# Patient Record
Sex: FEMALE | Race: WHITE | Employment: OTHER | URBAN - METROPOLITAN AREA
[De-identification: names, ages, dates, MRNs, and addresses within clinical notes are randomized per-mention and may not be internally consistent; named-entity substitution may affect disease eponyms.]

---

## 2018-11-15 ENCOUNTER — OFFICE VISIT (OUTPATIENT)
Dept: SURGERY | Age: 83
End: 2018-11-15

## 2018-11-15 VITALS
BODY MASS INDEX: 32.27 KG/M2 | OXYGEN SATURATION: 97 % | TEMPERATURE: 98.2 F | DIASTOLIC BLOOD PRESSURE: 84 MMHG | HEIGHT: 64 IN | WEIGHT: 189 LBS | RESPIRATION RATE: 18 BRPM | SYSTOLIC BLOOD PRESSURE: 130 MMHG | HEART RATE: 110 BPM

## 2018-11-15 DIAGNOSIS — L72.3 SEBACEOUS CYST: Primary | ICD-10-CM

## 2018-11-15 RX ORDER — FAMOTIDINE 40 MG/1
40 TABLET, FILM COATED ORAL DAILY
COMMUNITY

## 2018-11-15 RX ORDER — WARFARIN SODIUM 5 MG/1
5 TABLET ORAL DAILY
COMMUNITY

## 2018-11-15 RX ORDER — METOPROLOL TARTRATE 25 MG/1
TABLET, FILM COATED ORAL 2 TIMES DAILY
COMMUNITY

## 2018-11-15 RX ORDER — DICLOFENAC SODIUM 75 MG/1
TABLET, DELAYED RELEASE ORAL
COMMUNITY

## 2018-11-15 NOTE — PROGRESS NOTES
1. Have you been to the ER, urgent care clinic since your last visit? Hospitalized since your last visit? No    2. Have you seen or consulted any other health care providers outside of the [address] [place] [address] since your last visit? Include any pap smears or colon screening.  No

## 2018-11-15 NOTE — PROGRESS NOTES
HISTORY OF PRESENT ILLNESS  Brii Liu is a 82 y.o. female who presents for evaluation of an infected sebaceous cyst on her left shoulder. Ms. Jessie Tinoco tells me that she has had a subcutaneous mass on her left shoulder for several years now. The mass recently became larger and more bothersome to her. Associated purulent drainage. No abx therapy. She has otherwise been in her usual state of health. Past Medical History:  No date:  Adverse effect of anesthesia      Comment:  \"lost\" 4 days on Morphine  12/28/11: Arrhythmia      Comment:  DR Angeles Martinez DCd DILTIAZEM DT NO ISSUES 2014  No date: Arthritis  No date: GERD (gastroesophageal reflux disease)      Comment:  CONTROLLED WITH OMEPRAZOLE  No date: Ill-defined condition      Comment:  constipation  4/14/2014: Sebaceous cyst  No date: Snores    Past Surgical History:  1953: HX APPENDECTOMY  2008: HX BREAST BIOPSY  2006: HX BREAST REDUCTION  1981: HX CERVICAL FUSION  1973: HX DILATION AND CURETTAGE  No date: HX GI      Comment:  appendectomy  1975: HX GI      Comment:  hemorrhoidectomy  No date: HX HEENT      Comment:  neck surgery cervical discectomy  1974: HX HERNIA REPAIR      Comment:  UMBILICAL  2504: HX HYSTERECTOMY      Comment:  VAGINAL  2005: HX MOHS PROCEDURES      Comment:  right shoulder  1991: HX ORTHOPAEDIC      Comment:  left leg/ankle fx repair  2004: HX ORTHOPAEDIC      Comment:  right BUNIONECTOMY  2009: HX ORTHOPAEDIC      Comment:  RIGHT HAMMERTOE  2015: HX ORTHOPAEDIC      Comment:  R TOTAL KNEE  2012: HX ORTHOPAEDIC      Comment:  back fusion  5/15/13: HX OTHER SURGICAL      Comment:  surgery on neck glands/Parathyroid  4/14/14: HX OTHER SURGICAL      Comment:  cyst removed from back  2013: HX PARATHYROIDECTOMY  2012: HX UROLOGICAL      Comment:  lithotrypsy , 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     Review of patient's family history indicates:  Problem: Emphysema      Relation: Father          Age of Onset: (Not Specified)  Problem: Diabetes Relation: Sister          Age of Onset: (Not Specified)  Problem: Arthritis-osteo      Relation: Sister          Age of Onset: (Not Specified)  Problem: Arthritis-osteo      Relation: Sister          Age of Onset: (Not Specified)  Problem: Cancer      Relation: Daughter          Age of Onset: (Not Specified)          Comment: LUNG CANCER WITH METS TO SPINE  Problem: No Known Problems      Relation: Son          Age of Onset: (Not Specified)  Problem: Anesth Problems      Relation: Neg Hx          Age of Onset: (Not Specified)    Social History: Employment - Retired. Tobacco - Denies. EtOH - Denies. Review of systems negative except as noted. Review of Systems   Constitutional: Negative for chills and fever. Gastrointestinal: Negative for nausea and vomiting. Musculoskeletal:        Pain at site of infected sebaceous cyst.       Physical Exam   Constitutional: She appears well-developed and well-nourished. No distress. HENT:   Head: Normocephalic and atraumatic. Eyes: No scleral icterus. Neck: Neck supple. Cardiovascular: Normal rate and regular rhythm. Pulmonary/Chest: Effort normal and breath sounds normal.   Abdominal: Soft. She exhibits no distension. There is no tenderness. Musculoskeletal: Normal range of motion. Skin:        Approx. 2.5cm x 2.5cm tender, fluctuant subcutaneous mass. Overlying skin is erythematous and indurated. Purulent drainage is noted. Clinically, this is c/w an infected sebaceous cyst.   Vitals reviewed. ASSESSMENT and PLAN  Timothy Pompa is a 82 y.o. female with an infected sebaceous cyst on her left shoulder. In view of the findings on history and physical exam she should benefit from excision. Discussed procedure with her including risks of bleeding, infection, need for further surgery. She understands and wishes to proceed. Consent on chart.      [facility] GENERAL SURGERY [id number]  OFFICE PROCEDURE PROGRESS NOTE        Chart reviewed for the following:   Yobany Vázquez MD, have reviewed the History, Physical and updated the Allergic reactions for [address] performed immediately prior to start of procedure:   Yobany Vázquez MD, have performed the following reviews on Nasra Hews prior to the start of the procedure:            * Patient was identified by name and date of birth   * Agreement on procedure being performed was verified  * Risks and Benefits explained to the patient  * Procedure site verified and marked as necessary  * Patient was positioned for comfort  * Consent was signed and verified     Time: 3:10 PM      Date of procedure: 11/15/2018    Procedure performed by:  Maegan Fulton MD    Provider assisted by: Ирина Saha LPN    How tolerated by patient: tolerated the procedure well with no complications    Post Procedural Pain Scale: 0 - No Hurt    Comments: None. Procedure: Following betadine prep and drape, local anesthetic was infiltrated and an incision over the infected sebaceous cyst was opened sharply. Purulent material and sebaceous cyst contents were evacuated. Remaining sebaceous cyst was dissected free circumferentially and excised. Skin edges sharply debrided back to healthy appearing skin. Hemostasis with pressure. Wound packed with iodoform gauze. Dry dressing applied. Told Ms. Nakia Serna that she can remove packing tomorrow and get open wound wet. Asked her to start local wound care. She tells me that her  can do wound care. Tylenol or Motrin for pain. Do not believe that there is a need for abx at this time. Will see in two more weeks or earlier if need be. She is agreeable to this plan of action and is most certainly free to contact the office should any questions or concerns arise.       CC: Kathleen Levy MD

## 2018-11-15 NOTE — PATIENT INSTRUCTIONS
Daily wound care:  Remove packing daily  Repack with 1/2 inch iodoform packing strips  Cover with 4X4 and tape  You may bathe and or shower and get wound wet               Epidermoid Cyst: Care Instructions  Your Care Instructions  An epidermoid (say \"Radha\") cyst is a lump just under the skin. These cysts can form when a hair follicle becomes blocked. They are common in acne and may occur on the face, neck, back, and genitals. However, they can form anywhere on the body. These cysts are not cancer and do not lead to cancer. They tend not to hurt, but they can sometimes become swollen and painful. They also may break open (rupture) and cause scarring. These cysts sometimes do not cause problems and may not need treatment. If you have a cyst that is swollen and hurts, your doctor may inject it with a medicine to help it heal. But it is more likely that a painful cyst will need to be removed. Your doctor will give you a shot of numbing medicine and cut into the cyst to drain it or remove it. This makes the symptoms go away. Follow-up care is a key part of your treatment and safety. Be sure to make and go to all appointments, and call your doctor if you are having problems. It's also a good idea to know your test results and keep a list of the medicines you take. How can you care for yourself at home? · Do not squeeze the cyst or poke it with a needle to open it. This can cause swelling, redness, and infection. · Always have a doctor look at any new lumps you get to make sure that they are not serious. When should you call for help? Watch closely for changes in your health, and be sure to contact your doctor if:    · You have a fever, redness, or swelling after you get a shot of medicine in the cyst.     · You see or feel a new lump on your skin. Where can you learn more? Go to http://vita-angella.info/.   Enter A737 in the search box to learn more about \"Epidermoid Cyst: Care Instructions. \"  Current as of: April 18, 2018  Content Version: 11.8  © 0671-7086 Healthwise, [facility]. Care instructions adapted under license by [employer] (which disclaims liability or warranty for this information). If you have questions about a medical condition or this instruction, always ask your healthcare professional. [place] [address] any warranty or liability for your use of this information.

## 2018-11-27 ENCOUNTER — OFFICE VISIT (OUTPATIENT)
Dept: SURGERY | Age: 83
End: 2018-11-27

## 2018-11-27 VITALS
WEIGHT: 189 LBS | DIASTOLIC BLOOD PRESSURE: 86 MMHG | TEMPERATURE: 97.8 F | HEIGHT: 64 IN | HEART RATE: 76 BPM | OXYGEN SATURATION: 98 % | RESPIRATION RATE: 16 BRPM | BODY MASS INDEX: 32.27 KG/M2 | SYSTOLIC BLOOD PRESSURE: 132 MMHG

## 2018-11-27 DIAGNOSIS — L72.3 SEBACEOUS CYST: Primary | ICD-10-CM

## 2018-11-27 NOTE — PROGRESS NOTES
HISTORY OF PRESENT ILLNESS  Annabel Bryan is a 82 y.o. female who returns for a wound check. Ms. Jenelle Ricci is s/p excision of an infected sebaceous cyst from her left shoulder on 11/15/2018. Doing well since then. She has no complaints today. No problems with local wound care. Review of systems negative except as noted. Review of Systems   Constitutional: Negative for chills and fever. Musculoskeletal:        Denies pain at surgical site. Physical Exam   Constitutional: She appears well-developed and well-nourished. No distress. Musculoskeletal: Normal range of motion. Neurological: She is alert. Skin:   Left shoulder wound is clean and granulating. Vitals reviewed. ASSESSMENT and PLAN  Wound repacked with iodoform gauze. Reassured Ms. Jenelle Ricci that she is doing well and that the wound is healing nicely. Told her that she can remove packing tomorrow and to just keep a dry dressing over the wound. Also told her that she can get the open wound wet. Will see in two more weeks or earlier if need be.      CC: Seferino Whitaker MD

## 2018-11-27 NOTE — PROGRESS NOTES
1. Have you been to the ER, urgent care clinic since your last visit? Hospitalized since your last visit? No    2. Have you seen or consulted any other health care providers outside of the [address] [place] [address] since your last visit? Include any pap smears or colon screening.  No

## 2019-02-08 ENCOUNTER — TELEPHONE (OUTPATIENT)
Dept: SURGERY | Age: 84
End: 2019-02-08

## 2019-04-16 ENCOUNTER — OFFICE VISIT (OUTPATIENT)
Dept: SURGERY | Age: 84
End: 2019-04-16

## 2019-04-16 VITALS
HEIGHT: 64 IN | BODY MASS INDEX: 33.29 KG/M2 | WEIGHT: 195 LBS | TEMPERATURE: 97.7 F | SYSTOLIC BLOOD PRESSURE: 145 MMHG | DIASTOLIC BLOOD PRESSURE: 82 MMHG | HEART RATE: 76 BPM | OXYGEN SATURATION: 96 % | RESPIRATION RATE: 18 BRPM

## 2019-04-16 DIAGNOSIS — L72.3 SEBACEOUS CYST: Primary | ICD-10-CM

## 2019-04-16 NOTE — PROGRESS NOTES
HISTORY OF PRESENT ILLNESS Theresa Gonzalez is a 82 y.o. female who returns for a wound check. Ms. Aleksandr Martin is s/p excision of an infected sebaceous cyst from her left shoulder on 11/15/2018. Doing well since her last visit. She reports occasional discomfort at the surgical site. No purulent or bloody drainage. No other complaints today. She has otherwise been in her usual state of health. Past Medical History: 
No date: Adverse effect of anesthesia Comment:  \"lost\" 4 days on Morphine 12/28/11: Arrhythmia Comment:  DR Stephen Guzmán DCd DILTIAZEM DT NO ISSUES 2014 No date: Arthritis No date: GERD (gastroesophageal reflux disease) Comment:  CONTROLLED WITH OMEPRAZOLE No date: Ill-defined condition Comment:  constipation 4/14/2014: Sebaceous cyst 
No date: Snores Past Surgical History: 
1953: HX APPENDECTOMY 
2008: HX BREAST BIOPSY 
2006: HX BREAST REDUCTION 
1981: HX CERVICAL FUSION 
11/15/2018: HX CYST INCISION AND DRAINAGE; Left Comment:  excision of sebaceous cyst left shoulder 1973: HX DILATION AND CURETTAGE No date: HX GI Comment:  appendectomy 1975: HX GI Comment:  hemorrhoidectomy No date: HX HEENT Comment:  neck surgery cervical discectomy 1974: HX HERNIA REPAIR Comment:  UMBILICAL 
6601: HX HYSTERECTOMY Comment:  VAGINAL 
2005: HX MOHS PROCEDURES Comment:  right shoulder 
1991: HX ORTHOPAEDIC Comment:  left leg/ankle fx repair 2004: HX ORTHOPAEDIC Comment:  right BUNIONECTOMY 
2009: HX ORTHOPAEDIC Comment:  RIGHT HAMMERTOE 2015: HX ORTHOPAEDIC Comment:  R TOTAL KNEE 
2012: HX ORTHOPAEDIC Comment:  back fusion 5/15/13: HX OTHER SURGICAL Comment:  surgery on neck glands/Parathyroid 4/14/14: HX OTHER SURGICAL Comment:  cyst removed from back 2013: HX PARATHYROIDECTOMY 
2012: HX UROLOGICAL Comment:  lithotrypsy , 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 Review of patient's family history indicates: Problem: Emphysema Relation: Father Age of Onset: (Not Specified) Problem: Diabetes Relation: Sister Age of Onset: (Not Specified) Problem: Arthritis-osteo Relation: Sister Age of Onset: (Not Specified) Problem: Arthritis-osteo Relation: Sister Age of Onset: (Not Specified) Problem: Cancer Relation: Daughter Age of Onset: (Not Specified) Comment: LUNG CANCER WITH METS TO SPINE Problem: No Known Problems Relation: Son Age of Onset: (Not Specified) Problem: Anesth Problems Relation: Neg Hx Age of Onset: (Not Specified) Social History Socioeconomic History Marital status:  Spouse name: Not on file Number of children: Not on file Years of education: Not on file Highest education level: Not on file Tobacco Use Smoking status: Never Smoker Smokeless tobacco: Never Used Substance and Sexual Activity Alcohol use: No 
    Drug use: No 
 
Review of systems negative except as noted. Review of Systems Constitutional: Negative for chills and fever. Musculoskeletal:  
     Occasional pain at surgical site. Physical Exam  
Constitutional: She appears well-developed and well-nourished. No distress. HENT:  
Head: Normocephalic and atraumatic. Cardiovascular: Normal rate and regular rhythm. Pulmonary/Chest: Effort normal and breath sounds normal.  
Abdominal: Soft. She exhibits no distension. There is no tenderness. Musculoskeletal: Normal range of motion. Neurological: She is alert. Skin:  
 
  
Keloid on left shoulder at surgical site. No purulent drainage is noted. No associated induration or cellulitis. No apparent recurrent sebaceous cyst.  
 
 
ASSESSMENT and PLAN Reassured Ms. Dawit Martin that she does not appear to have a recurrent sebaceous cyst and that there is no evidence of infection.  No need for abx therapy at this time. Explained to her that a keloid developed at the surgical site and that there is no indication for surgical intervention at this time. Asked Ms. Pichardo to follow up with Dr. Chris Mcgee as scheduled. Will see as needed.   
 
CC: Ilya Conklin MD

## 2019-04-16 NOTE — PROGRESS NOTES
1. Have you been to the ER, urgent care clinic since your last visit? Hospitalized since your last visit? No 
 
2. Have you seen or consulted any other health care providers outside of the [address] [place] [address] since your last visit? Include any pap smears or colon screening.  No

## 2019-09-09 ENCOUNTER — HOSPITAL ENCOUNTER (OUTPATIENT)
Dept: MRI IMAGING | Age: 84
Discharge: HOME | End: 2019-09-09
Attending: ORTHOPAEDIC SURGERY
Payer: MEDICARE

## 2019-09-09 DIAGNOSIS — M51.36 DDD (DEGENERATIVE DISC DISEASE), LUMBAR: ICD-10-CM

## 2019-09-09 DIAGNOSIS — M54.50 LOW BACK PAIN: ICD-10-CM

## 2019-09-09 DIAGNOSIS — M41.20 SCOLIOSIS (AND KYPHOSCOLIOSIS), IDIOPATHIC: ICD-10-CM

## 2019-09-09 PROCEDURE — 72148 MRI LUMBAR SPINE W/O DYE: CPT

## 2022-10-28 ENCOUNTER — OFFICE VISIT (OUTPATIENT)
Dept: ORTHOPEDIC SURGERY | Age: 87
End: 2022-10-28
Payer: MEDICARE

## 2022-10-28 VITALS — HEIGHT: 64 IN | BODY MASS INDEX: 32.06 KG/M2 | WEIGHT: 187.8 LBS

## 2022-10-28 DIAGNOSIS — Z96.652 STATUS POST TOTAL LEFT KNEE REPLACEMENT: ICD-10-CM

## 2022-10-28 DIAGNOSIS — T84.033A MECHANICAL LOOSENING OF INTERNAL LEFT KNEE PROSTHETIC JOINT, INITIAL ENCOUNTER (HCC): Primary | ICD-10-CM

## 2022-10-28 PROCEDURE — G8427 DOCREV CUR MEDS BY ELIG CLIN: HCPCS | Performed by: ORTHOPAEDIC SURGERY

## 2022-10-28 PROCEDURE — G8400 PT W/DXA NO RESULTS DOC: HCPCS | Performed by: ORTHOPAEDIC SURGERY

## 2022-10-28 PROCEDURE — G8417 CALC BMI ABV UP PARAM F/U: HCPCS | Performed by: ORTHOPAEDIC SURGERY

## 2022-10-28 PROCEDURE — 99214 OFFICE O/P EST MOD 30 MIN: CPT | Performed by: ORTHOPAEDIC SURGERY

## 2022-10-28 PROCEDURE — 1123F ACP DISCUSS/DSCN MKR DOCD: CPT | Performed by: ORTHOPAEDIC SURGERY

## 2022-10-28 PROCEDURE — 1090F PRES/ABSN URINE INCON ASSESS: CPT | Performed by: ORTHOPAEDIC SURGERY

## 2022-10-28 PROCEDURE — 1101F PT FALLS ASSESS-DOCD LE1/YR: CPT | Performed by: ORTHOPAEDIC SURGERY

## 2022-10-28 PROCEDURE — G8432 DEP SCR NOT DOC, RNG: HCPCS | Performed by: ORTHOPAEDIC SURGERY

## 2022-10-28 PROCEDURE — G8536 NO DOC ELDER MAL SCRN: HCPCS | Performed by: ORTHOPAEDIC SURGERY

## 2022-10-28 RX ORDER — BISMUTH SUBSALICYLATE 262 MG
1 TABLET,CHEWABLE ORAL DAILY
COMMUNITY

## 2022-10-28 RX ORDER — CHOLECALCIFEROL (VITAMIN D3) 125 MCG
5 CAPSULE ORAL
COMMUNITY

## 2022-10-28 NOTE — PROGRESS NOTES
Yony Leigh (: 1935) is a 82 y.o. female, patient, here for evaluation of the following chief complaint(s):  Surgical Follow-up (LTK: 16)       SUBJECTIVE/OBJECTIVE:  Yony Leigh presents today for evaluation of pain adjacent to left total knee replacement. I performed staged bilateral total knees almost 7 years ago. Right continues to do very well. She has had progressive activity related pain on the left side. When I saw her a few years ago she had some soreness but no significant pain. She is now having more significant start up pain and gelling symptoms. She does not trust the knee. She is only able to be on her feet for short periods of time. At times pain is bad enough to use a walker. She has no rest pain or night pain. She has significant difficulty with simple ADLs. History of atrial fibrillation. She is scheduled for stress test next week. PHYSICAL EXAM:  Vitals: Ht 5' 4\" (1.626 m)   Wt 187 lb 12.8 oz (85.2 kg)   BMI 32.24 kg/m²   Body mass index is 32.24 kg/m². 82y.o. year old F, no distress. Ambulates with mild limp on the left side. Pain-free motion left hip. Negative Stinchfield. Left knee neutral alignment. Healed midline anterior scar. Trace effusion left knee. Tender across the medial joint line. Full active knee extension with flexion to approximately 115 degrees. Patella tracks centrally. No gross instability. Symmetrical palpable distal pulses. No gross motor or sensory deficits in lower extremities. No distal edema. IMAGING:  Radiographs: XR Results (most recent):  Results from Appointment encounter on 10/28/22    XR KNEE LT 3 V    Narrative  3 x-ray views of left knee including AP, lateral, sunrise demonstrate entry position and alignment of cemented posterior stabilized attune total knee femoral component. No evidence of loosening.   Tibial component appears loose with complete lucency around the tibial tray, and the tibial tray has migrated into varus compared to previous x-rays 2 years ago. ASSESSMENT/PLAN:  1. Mechanical loosening of internal left knee prosthetic joint, initial encounter ([address]  2. 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 She is very unhappy with progressive pain and dysfunction and would like to proceed with revision left total knee replacement. Discussed risks and benefits in detail as well as anticipated hospital stay and course of rehabilitation. She will see her primary care physician prior to surgery. She is scheduled for stress test next week with her cardiologist.  We will look for clearance note from them as well. Plan to check sed rate and C-reactive protein with routine preoperative labs. Topical tranexamic acid intraoperatively; resume warfarin for postoperative DVT prophylaxis. Plan to use Juan persona revision components. Return for surgery. Review Of Systems  ROS    Positive for: Musculoskeletal  Last edited by Flynn Holloway on 10/28/2022 10:29 AM.         Patient denies any recent fever, chills, nausea, vomiting, chest pain, or shortness of breath. Allergies   Allergen Reactions    Codeine Nausea and Vomiting    Demerol [Meperidine] Palpitations and Other (comments)     \"made me hyper\"    Morphine Other (comments)     \"disoriented\"    Penicillin G Hives and Swelling       Current Outpatient Medications   Medication Sig    cholecalciferol, vitamin D3, (VITAMIN D3 PO) Take 1 Tablet by mouth daily. multivitamin (ONE A DAY) tablet Take 1 Tablet by mouth daily. melatonin 5 mg tablet Take 5 mg by mouth nightly. famotidine (PEPCID) 40 mg tablet Take 40 mg by mouth daily. metoprolol tartrate (LOPRESSOR) 25 mg tablet Take  by mouth two (2) times a day. warfarin (COUMADIN) 5 mg tablet Take 5 mg by mouth daily.     pravastatin (PRAVACHOL) 40 mg tablet Take 40 mg by mouth nightly. furosemide (LASIX) 40 mg tablet Take  by mouth daily. Indications: SWELLING IN ANKLES; LEFT SWELLS MORE FOLLOWING FRACTURES    diclofenac EC (VOLTAREN) 75 mg EC tablet Take  by mouth. aspirin delayed-release 325 mg tablet Take 1 Tab by mouth two (2) times a day. traMADol (ULTRAM) 50 mg tablet Take 1-2 Tabs by mouth every six (6) hours as needed for Pain. Max Daily Amount: 400 mg.    magnesium hydroxide (MILK OF MAGNESIA) 400 mg/5 mL suspension Take 30 mL by mouth daily as needed for Constipation. acetaminophen (TYLENOL) 325 mg tablet Take  by mouth every four (4) hours as needed. PT SAYS THIS MED PUTS HER TO SLEEP. omeprazole (PRILOSEC) 20 mg capsule Take 20 mg by mouth daily. No current facility-administered medications for this visit.        Past Medical History:   Diagnosis Date    Adverse effect of anesthesia     \"lost\" 4 days on Morphine    Arrhythmia 12/28/11    DR Tona Chew DCd DILTIAZEM DT NO ISSUES 2014    Arthritis     GERD (gastroesophageal reflux disease)     CONTROLLED WITH OMEPRAZOLE    Ill-defined condition     constipation    Sebaceous cyst 4/14/2014    Snores         Past Surgical History:   Procedure Laterality Date    HX APPENDECTOMY  1953    HX BREAST BIOPSY  2008    HX BREAST REDUCTION  2006    HX CERVICAL FUSION  1981    HX CYST INCISION AND DRAINAGE Left 11/15/2018    excision of sebaceous cyst left shoulder     HX DILATION AND CURETTAGE  1973    HX GI      appendectomy    HX GI  1975    hemorrhoidectomy    HX HEENT      neck surgery cervical discectomy    HX HERNIA REPAIR  9409    UMBILICAL    HX HYSTERECTOMY  1976    VAGINAL    HX KNEE REPLACEMENT Left 08/11/2016    HX MOHS PROCEDURES  2005    right shoulder    HX ORTHOPAEDIC  1991    left leg/ankle fx repair    HX ORTHOPAEDIC  2004    right BUNIONECTOMY    HX ORTHOPAEDIC  2009    RIGHT HAMMERTOE    HX ORTHOPAEDIC  2015    R TOTAL KNEE    HX ORTHOPAEDIC  2012    back fusion    HX OTHER SURGICAL  05/15/2013    surgery on neck glands/Parathyroid    HX OTHER SURGICAL  04/14/2014    cyst removed from back    HX PARATHYROIDECTOMY  2013    HX UROLOGICAL  2012    lithotrypsy , 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        Family History   Problem Relation Age of Onset    Emphysema Father     Diabetes Sister     OSTEOARTHRITIS Sister     OSTEOARTHRITIS Sister     Cancer Daughter         LUNG CANCER WITH METS TO SPINE    No Known Problems Son     Anesth Problems Neg Hx         Social History     Socioeconomic History    Marital status:      Spouse name: Not on file    Number of children: Not on file    Years of education: Not on file    Highest education level: Not on file   Occupational History    Not on file   Tobacco Use    Smoking status: Never    Smokeless tobacco: Never   Substance and Sexual Activity    Alcohol use: No    Drug use: No    Sexual activity: Not on file   Other Topics Concern    Not on file   Social History Narrative    Not on file     Social Determinants of Health     Financial Resource Strain: Not on file   Food Insecurity: Not on file   Transportation Needs: Not on file   Physical Activity: Not on file   Stress: Not on file   Social Connections: Not on file   Intimate Partner Violence: Not on file   Housing Stability: Not on file       Orders Placed This Encounter    XR KNEE LT 3 V     Standing Status:   Future     Number of Occurrences:   1     Standing Expiration Date:   10/29/2023        An electronic signature was used to authenticate this note.   -- Meggan Reno MD

## 2022-10-28 NOTE — LETTER
11/1/2022    Patient: Uriel Chow   YOB: 1935   Date of Visit: 10/28/2022     Tamar Downing MD  [address] [address]  Via Fax: [phone]    Dear Tamar Downing MD,      Thank you for referring Ms. Luis Taylor to [facility] for evaluation. My notes for this consultation are attached. If you have questions, please do not hesitate to call me. I look forward to following your patient along with you.       Sincerely,    Sangita Farley MD

## 2022-11-10 ENCOUNTER — TRANSCRIBE ORDER (OUTPATIENT)
Dept: SCHEDULING | Age: 87
End: 2022-11-10

## 2022-11-10 DIAGNOSIS — T84.033A MECHANICAL LOOSENING OF INTERNAL LEFT KNEE PROSTHETIC JOINT, INITIAL ENCOUNTER (HCC): Primary | ICD-10-CM

## 2022-11-10 DIAGNOSIS — Z12.31 VISIT FOR SCREENING MAMMOGRAM: Primary | ICD-10-CM

## 2022-11-10 DIAGNOSIS — Z96.652 STATUS POST TOTAL LEFT KNEE REPLACEMENT: ICD-10-CM

## 2022-11-22 ENCOUNTER — OFFICE VISIT (OUTPATIENT)
Dept: ORTHOPEDIC SURGERY | Age: 87
End: 2022-11-22
Payer: MEDICARE

## 2022-11-22 DIAGNOSIS — M12.811 ROTATOR CUFF ARTHROPATHY, RIGHT: ICD-10-CM

## 2022-11-22 DIAGNOSIS — M25.511 CHRONIC RIGHT SHOULDER PAIN: Primary | ICD-10-CM

## 2022-11-22 DIAGNOSIS — G89.29 CHRONIC RIGHT SHOULDER PAIN: Primary | ICD-10-CM

## 2022-11-22 PROCEDURE — 1123F ACP DISCUSS/DSCN MKR DOCD: CPT | Performed by: ORTHOPAEDIC SURGERY

## 2022-11-22 PROCEDURE — 20610 DRAIN/INJ JOINT/BURSA W/O US: CPT | Performed by: ORTHOPAEDIC SURGERY

## 2022-11-22 PROCEDURE — G8417 CALC BMI ABV UP PARAM F/U: HCPCS | Performed by: ORTHOPAEDIC SURGERY

## 2022-11-22 PROCEDURE — G8427 DOCREV CUR MEDS BY ELIG CLIN: HCPCS | Performed by: ORTHOPAEDIC SURGERY

## 2022-11-22 PROCEDURE — 1090F PRES/ABSN URINE INCON ASSESS: CPT | Performed by: ORTHOPAEDIC SURGERY

## 2022-11-22 PROCEDURE — G8536 NO DOC ELDER MAL SCRN: HCPCS | Performed by: ORTHOPAEDIC SURGERY

## 2022-11-22 PROCEDURE — G8432 DEP SCR NOT DOC, RNG: HCPCS | Performed by: ORTHOPAEDIC SURGERY

## 2022-11-22 PROCEDURE — 1101F PT FALLS ASSESS-DOCD LE1/YR: CPT | Performed by: ORTHOPAEDIC SURGERY

## 2022-11-22 PROCEDURE — 99214 OFFICE O/P EST MOD 30 MIN: CPT | Performed by: ORTHOPAEDIC SURGERY

## 2022-11-22 PROCEDURE — G8400 PT W/DXA NO RESULTS DOC: HCPCS | Performed by: ORTHOPAEDIC SURGERY

## 2022-11-22 RX ORDER — METHYLPREDNISOLONE ACETATE 80 MG/ML
80 INJECTION, SUSPENSION INTRA-ARTICULAR; INTRALESIONAL; INTRAMUSCULAR; SOFT TISSUE ONCE
Status: COMPLETED | OUTPATIENT
Start: 2022-11-22 | End: 2022-11-22

## 2022-11-22 RX ORDER — BUPIVACAINE HYDROCHLORIDE 7.5 MG/ML
2 INJECTION, SOLUTION EPIDURAL; RETROBULBAR ONCE
Status: COMPLETED | OUTPATIENT
Start: 2022-11-22 | End: 2022-11-22

## 2022-11-22 RX ADMIN — BUPIVACAINE HYDROCHLORIDE 15 MG: 7.5 INJECTION, SOLUTION EPIDURAL; RETROBULBAR at 14:45

## 2022-11-22 RX ADMIN — METHYLPREDNISOLONE ACETATE 80 MG: 80 INJECTION, SUSPENSION INTRA-ARTICULAR; INTRALESIONAL; INTRAMUSCULAR; SOFT TISSUE at 14:46

## 2022-11-22 NOTE — PROGRESS NOTES
Yamile Durbin (: 1935) is a 82 y.o. female, patient, here for evaluation of the following chief complaint(s):  Shoulder Pain (right) and Arm Pain (right)       HPI:    She began having gradually increasing right shoulder and arm pain several months ago. The patient reports no specific injury. She gives no detail or description of her discomfort. The patient reports taking no medication for pain. She was not seen in the emergency room for right shoulder pain. She reports no previous or related right shoulder surgery. Allergies   Allergen Reactions    Codeine Nausea and Vomiting    Demerol [Meperidine] Palpitations and Other (comments)     \"made me hyper\"    Morphine Other (comments)     \"disoriented\"    Penicillin G Hives and Swelling       Current Outpatient Medications   Medication Sig    cholecalciferol, vitamin D3, (VITAMIN D3 PO) Take 1 Tablet by mouth daily. multivitamin (ONE A DAY) tablet Take 1 Tablet by mouth daily. melatonin 5 mg tablet Take 5 mg by mouth nightly. famotidine (PEPCID) 40 mg tablet Take 40 mg by mouth daily. diclofenac EC (VOLTAREN) 75 mg EC tablet Take  by mouth.    metoprolol tartrate (LOPRESSOR) 25 mg tablet Take  by mouth two (2) times a day. warfarin (COUMADIN) 5 mg tablet Take 5 mg by mouth daily. aspirin delayed-release 325 mg tablet Take 1 Tab by mouth two (2) times a day. traMADol (ULTRAM) 50 mg tablet Take 1-2 Tabs by mouth every six (6) hours as needed for Pain. Max Daily Amount: 400 mg.    magnesium hydroxide (MILK OF MAGNESIA) 400 mg/5 mL suspension Take 30 mL by mouth daily as needed for Constipation. pravastatin (PRAVACHOL) 40 mg tablet Take 40 mg by mouth nightly. furosemide (LASIX) 40 mg tablet Take  by mouth daily. Indications: SWELLING IN ANKLES; LEFT SWELLS MORE FOLLOWING FRACTURES    acetaminophen (TYLENOL) 325 mg tablet Take  by mouth every four (4) hours as needed. PT SAYS THIS MED PUTS HER TO SLEEP.      omeprazole (PRILOSEC) 20 mg capsule Take 20 mg by mouth daily. No current facility-administered medications for this visit.        Past Medical History:   Diagnosis Date    Adverse effect of anesthesia     \"lost\" 4 days on Morphine    Arrhythmia 12/28/11    DR Mila Giordano DCd DILTIAZEM DT NO ISSUES 2014    Arthritis     GERD (gastroesophageal reflux disease)     CONTROLLED WITH OMEPRAZOLE    Ill-defined condition     constipation    Sebaceous cyst 4/14/2014    Snores         Past Surgical History:   Procedure Laterality Date    HX APPENDECTOMY  1953    HX BREAST BIOPSY  2008    HX BREAST REDUCTION  2006    HX CERVICAL FUSION  1981    HX CYST INCISION AND DRAINAGE Left 11/15/2018    excision of sebaceous cyst left shoulder     HX DILATION AND CURETTAGE  1973    HX GI      appendectomy    HX GI  1975    hemorrhoidectomy    HX HEENT      neck surgery cervical discectomy    HX HERNIA REPAIR  3685    UMBILICAL    HX HYSTERECTOMY  1976    VAGINAL    HX KNEE REPLACEMENT Left 08/11/2016    HX MOHS PROCEDURES  2005    right shoulder    HX ORTHOPAEDIC  1991    left leg/ankle fx repair    HX ORTHOPAEDIC  2004    right BUNIONECTOMY    HX ORTHOPAEDIC  2009    RIGHT HAMMERTOE    HX ORTHOPAEDIC  2015    R TOTAL KNEE    HX ORTHOPAEDIC  2012    back fusion    HX OTHER SURGICAL  05/15/2013    surgery on neck glands/Parathyroid    HX OTHER SURGICAL  04/14/2014    cyst removed from back    HX PARATHYROIDECTOMY  2013    HX UROLOGICAL  2012    lithotrypsy , 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        Family History   Problem Relation Age of Onset    Emphysema Father     Diabetes Sister     OSTEOARTHRITIS Sister     OSTEOARTHRITIS Sister     Cancer Daughter         LUNG CANCER WITH METS TO SPINE    No Known Problems Son     Anesth Problems Neg Hx         Social History     Socioeconomic History    Marital status:      Spouse name: Not on file    Number of children: Not on file    Years of education: Not on file    Highest education level: Not on file Occupational History    Not on file   Tobacco Use    Smoking status: Never    Smokeless tobacco: Never   Substance and Sexual Activity    Alcohol use: No    Drug use: No    Sexual activity: Not on file   Other Topics Concern    Not on file   Social History Narrative    Not on file     Social Determinants of Health     Financial Resource Strain: Not on file   Food Insecurity: Not on file   Transportation Needs: Not on file   Physical Activity: Not on file   Stress: Not on file   Social Connections: Not on file   Intimate Partner Violence: Not on file   Housing Stability: Not on file       Review of Systems   All other systems reviewed and are negative. Vitals: There were no vitals taken for this visit. There is no height or weight on file to calculate BMI. Ortho Exam     The patient is well-developed and well-nourished. The patient presents today in alert and oriented x3 with a normal mood and affect. The patient stands with a normal weightbearing line and walks with a normal gait. Right shoulder: No shoulder girdle atrophy. There is no soft tissue swelling, ecchymosis, abrasions, or lacerations. Active range of motion of the shoulder is limited to 130 degrees of forward flexion, 120 degrees of lateral abduction, and 70 degrees of external rotation. Internal rotation is to the SI joint and is painful. Passive range of motion is full with a painful impingement sign and painful [place] sign. Rotator cuff strength is painful to evaluate and is a 4+/5 with forward flexion and lateral abduction. External rotation strength is maintained. There is no crepitation about the joint. [facility] joint does reproduce discomfort and there is pain elicited with cross body abduction. Strength of the extremity is 5/5 strength at biceps/triceps/wrist extension and is comparable to the contralateral side.   DTRs are intact at +2/4 and are symmetrical.  Cervical range of motion is full with no pain to palpation along the paraspinal musculature medial border of the scapula. Spurling's sign is negative. ASSESSMENT/PLAN:      1. Chronic right shoulder pain  -     XR SHOULDER RT AP/LAT MIN 2 V; Future  2. Rotator cuff arthropathy, right  -     bupivacaine (PF) (MARCAINE) 0.75 % (7.5 mg/mL) injection 15 mg; 15 mg (2 mL), Intra artICUlar, ONCE, 1 dose, On Tue 11/22/22 at 1500  -     methylPREDNISolone acetate (DEPO-MEDROL) 80 mg/mL injection 80 mg; 80 mg, Intra artICUlar, ONCE, 1 dose, On Tue 11/22/22 at 1500     XR Results (most recent):  Results from Appointment encounter on 11/22/22    XR SHOULDER RT AP/LAT MIN 2 V    Narrative  Right shoulder 3 view x-ray showed no evidence of a fracture or dislocation. Evidence of of rotator cuff arthropathy with proximal migration of the humeral head. Below is the assessment and plan developed based on review of pertinent history, physical exam, labs, studies, and medications. We discussed the patient's right shoulder and upper extremity discomfort. Her signs, symptoms, physical exam, description of her pain, and x-rays are consistent with rotator cuff arthropathy. The possible treatment options were discussed with the patient and we elected to inject her right shoulder with cortisone today to try and alleviate some of her discomfort. The risks and benefits of the injection were discussed in detail with the patient and under sterile prep the patient's right shoulder subacromial space was injected with 1 cc of 80 mg/cc of Depo-Medrol and 2 ccs of 0.75% Sensorcaine. She tolerated the injection well. I did encourage her to continue to ice when possible, modify her activity level based on her right shoulder pain, and use anti-inflammatory medication when necessary. The patient will work on range of motion, strengthening, and stretching exercises with an at-home exercise program as pain tolerates. I will see her back on an as-needed basis for right shoulder pain. If she continues to have persistence of her pain she will consider following up with one of our total shoulder specialist.    Return if symptoms worsen or fail to improve. An electronic signature was used to authenticate this note.   -- Regina Carmen MD

## 2022-12-08 ENCOUNTER — HOSPITAL ENCOUNTER (OUTPATIENT)
Dept: PREADMISSION TESTING | Age: 87
Discharge: HOME | End: 2022-12-08
Payer: MEDICARE

## 2022-12-08 VITALS
BODY MASS INDEX: 32.37 KG/M2 | RESPIRATION RATE: 18 BRPM | DIASTOLIC BLOOD PRESSURE: 69 MMHG | SYSTOLIC BLOOD PRESSURE: 130 MMHG | HEIGHT: 64 IN | HEART RATE: 111 BPM | WEIGHT: 189.6 LBS | TEMPERATURE: 97.5 F

## 2022-12-08 LAB
ABO + RH BLD: NORMAL
ANION GAP SERPL CALC-SCNC: 4 MMOL/L (ref 5–15)
APPEARANCE UR: CLEAR
BACTERIA URNS QL MICRO: NEGATIVE /HPF
BILIRUB UR QL: NEGATIVE
BLOOD GROUP ANTIBODIES SERPL: NORMAL
BUN SERPL-MCNC: 18 MG/DL (ref 6–20)
BUN/CREAT SERPL: 15 (ref 12–20)
CALCIUM SERPL-MCNC: 9.1 MG/DL (ref 8.5–10.1)
CHLORIDE SERPL-SCNC: 106 MMOL/L (ref 97–108)
CO2 SERPL-SCNC: 32 MMOL/L (ref 21–32)
COLOR UR: NORMAL
CREAT SERPL-MCNC: 1.21 MG/DL (ref 0.55–1.02)
CRP SERPL-MCNC: 0.5 MG/DL (ref 0–0.6)
EPITH CASTS URNS QL MICRO: NORMAL /LPF
ERYTHROCYTE [DISTWIDTH] IN BLOOD BY AUTOMATED COUNT: 13.2 % (ref 11.5–14.5)
ERYTHROCYTE [SEDIMENTATION RATE] IN BLOOD: 21 MM/HR (ref 0–30)
EST. AVERAGE GLUCOSE BLD GHB EST-MCNC: 120 MG/DL
GLUCOSE SERPL-MCNC: 136 MG/DL (ref 65–100)
GLUCOSE UR STRIP.AUTO-MCNC: NEGATIVE MG/DL
HBA1C MFR BLD: 5.8 % (ref 4–5.6)
HCT VFR BLD AUTO: 41.7 % (ref 35–47)
HGB BLD-MCNC: 13.5 G/DL (ref 11.5–16)
HGB UR QL STRIP: NEGATIVE
HYALINE CASTS URNS QL MICRO: NORMAL /LPF (ref 0–5)
INR PPP: 2.6 (ref 0.9–1.1)
KETONES UR QL STRIP.AUTO: NEGATIVE MG/DL
LEUKOCYTE ESTERASE UR QL STRIP.AUTO: NEGATIVE
MCH RBC QN AUTO: 29.8 PG (ref 26–34)
MCHC RBC AUTO-ENTMCNC: 32.4 G/DL (ref 30–36.5)
MCV RBC AUTO: 92.1 FL (ref 80–99)
NITRITE UR QL STRIP.AUTO: NEGATIVE
NRBC # BLD: 0 K/UL (ref 0–0.01)
NRBC BLD-RTO: 0 PER 100 WBC
PH UR STRIP: 5.5 [PH] (ref 5–8)
PLATELET # BLD AUTO: 269 K/UL (ref 150–400)
PMV BLD AUTO: 10.2 FL (ref 8.9–12.9)
POTASSIUM SERPL-SCNC: 3.5 MMOL/L (ref 3.5–5.1)
PROT UR STRIP-MCNC: NEGATIVE MG/DL
PROTHROMBIN TIME: 25.7 SEC (ref 9–11.1)
RBC # BLD AUTO: 4.53 M/UL (ref 3.8–5.2)
RBC #/AREA URNS HPF: NORMAL /HPF (ref 0–5)
SODIUM SERPL-SCNC: 142 MMOL/L (ref 136–145)
SP GR UR REFRACTOMETRY: 1.01 (ref 1–1.03)
SPECIMEN EXP DATE BLD: NORMAL
UA: UC IF INDICATED,UAUC: NORMAL
UROBILINOGEN UR QL STRIP.AUTO: 0.2 EU/DL (ref 0.2–1)
WBC # BLD AUTO: 6.7 K/UL (ref 3.6–11)
WBC URNS QL MICRO: NORMAL /HPF (ref 0–4)

## 2022-12-08 PROCEDURE — 80048 BASIC METABOLIC PNL TOTAL CA: CPT

## 2022-12-08 PROCEDURE — 86140 C-REACTIVE PROTEIN: CPT

## 2022-12-08 PROCEDURE — 36415 COLL VENOUS BLD VENIPUNCTURE: CPT

## 2022-12-08 PROCEDURE — 81001 URINALYSIS AUTO W/SCOPE: CPT

## 2022-12-08 PROCEDURE — 85027 COMPLETE CBC AUTOMATED: CPT

## 2022-12-08 PROCEDURE — 85652 RBC SED RATE AUTOMATED: CPT

## 2022-12-08 PROCEDURE — 83036 HEMOGLOBIN GLYCOSYLATED A1C: CPT

## 2022-12-08 PROCEDURE — 93005 ELECTROCARDIOGRAM TRACING: CPT

## 2022-12-08 PROCEDURE — 85610 PROTHROMBIN TIME: CPT

## 2022-12-08 PROCEDURE — 86900 BLOOD TYPING SEROLOGIC ABO: CPT

## 2022-12-08 RX ORDER — ISOSORBIDE MONONITRATE 30 MG/1
30 TABLET, EXTENDED RELEASE ORAL
COMMUNITY

## 2022-12-08 RX ORDER — POLYETHYLENE GLYCOL 3350 17 G/17G
17 POWDER, FOR SOLUTION ORAL DAILY
COMMUNITY

## 2022-12-08 NOTE — PERIOP NOTES
[facility] INSTRUCTIONS  ORTHOPAEDIC    Surgery Date:   12/21/22  Your surgeon's office or [address] [place] [address] staff will call you between 4 PM- 8 PM the day before surgery with your arrival time. If your surgery is on a Monday, you will receive a call the preceding Friday./    Please report to [facility] Patient Access/Admitting on the 1st floor. Bring your insurance card, photo identification, and any copayment (if applicable). If you are going home the same day of your surgery, you must have a responsible adult to drive you home. You need to have a responsible adult to stay with you the first 24 hours after surgery and you should not drive a car for 24 hours following your surgery. Do NOT eat any solid foods after midnight the night before surgery including candy, mints or gum. You may drink clear liquids from midnight until 1 hour prior to arrival time. You may drink up to 12 ounces at one time every 4 hours. Do NOT drink alcohol or smoke 24 hours before surgery. STOP smoking for 14 days prior as it helps with breathing and healing after surgery. If your arrival time is 3pm or later, you may eat a light breakfast before 8am (toast, bagel-no butter, black coffee, plain tea, fruit juice-no pulp) Please note special instructions, if applicable, below for medications. If you are being admitted to the hospital,please leave personal belongings/luggage in your car until you have an assigned hospital room number. Please wear comfortable clothes. Wear your glasses instead of contacts. We ask that all money, jewelry and valuables be left at home. Wear no make up, particularly mascara, the day of surgery. All body piercings, rings, and jewelry need to be removed and left at home. Please remove any nail polish or artificial nails from your fingernails. Please wear your hair loose or down. Please no pony-tails, buns, or any metal hair accessories.  If you shower the morning of surgery, please do not apply any lotions or powders afterwards. You may wear deodorant. Do not shave any body area within 24 hours of your surgery. Please follow all instructions to avoid any potential surgical cancellation. Should your physical condition change, (i.e. fever, cold, flu, etc.) please notify your surgeon as soon as possible. It is important to be on time. If a situation occurs where you may be delayed, please call:  [phone] / [phone] on the day of surgery. The Preadmission Testing staff can be reached at [phone]. Special instructions: [address] DAY OF SURGERY    Current Outpatient Medications   Medication Sig    isosorbide mononitrate ER (IMDUR) 30 mg tablet Take 30 mg by mouth every morning. polyethylene glycol (Miralax) 17 gram/dose powder Take 17 g by mouth daily. cholecalciferol, vitamin D3, (VITAMIN D3 PO) Take 1 Tablet by mouth daily. multivitamin (ONE A DAY) tablet Take 1 Tablet by mouth every evening. melatonin 5 mg tablet Take 5 mg by mouth nightly. famotidine (PEPCID) 40 mg tablet Take 40 mg by mouth every evening. metoprolol tartrate (LOPRESSOR) 25 mg tablet Take 50 mg by mouth daily. warfarin (COUMADIN) 5 mg tablet Take 2.5 mg by mouth every evening. pravastatin (PRAVACHOL) 40 mg tablet Take 40 mg by mouth nightly. furosemide (LASIX) 40 mg tablet Take 40 mg by mouth daily. Indications: SWELLING IN ANKLES; LEFT SWELLS MORE FOLLOWING FRACTURES     No current facility-administered medications for this encounter.        YOU MUST ONLY TAKE THESE MEDICATIONS THE MORNING OF SURGERY WITH A SIP OF WATER: METOPROLOL, ISOSORBIDE MONONITRATE  MEDICATIONS TO TAKE THE MORNING OF SURGERY ONLY IF NEEDED: NONE  HOLD these prescription medications BEFORE Surgery: DO NOT TAKE FUROSEMIDE DAY OF SURGERY  Ask your surgeon/prescribing physician about when/if to STOP taking these medications: WARFARIN  Stop any non-steroidal anti-inflammatory drugs (i.e. Ibuprofen, Naproxen, Advil, Aleve) 3 days before surgery. You may take Tylenol. STOP all vitamins and herbal supplements 1 week prior to  surgery. If you are currently taking Plavix, Coumadin, or any other blood-thinning/anticoagulant medication contact your prescribing physician for instructions. Preventing Infections Before and After - Your Surgery    IMPORTANT INSTRUCTIONS    You play an important role in your health and preparation for surgery. To reduce the germs on your skin you will need to shower with CHG soap (Chorhexidine gluconate 4%) two times before surgery. CHG soap (Hibiclens, Hex-A-Clens or store brand)  CHG soap will be provided at your Preadmission Testing (PAT) appointment. If you do not have a PAT appointment before surgery, you may arrange to  CHG soap from our office or purchase CHG soap at a pharmacy, grocery or department store. You need to purchase TWO 4 ounce bottles to use for your 2 showers. Steps to follow:  Velora Cobia your hair with your normal shampoo and your body with regular soap and rinse well to remove shampoo and soap from your skin. Wet a clean washcloth and turn off the shower. Put CHG soap on washcloth and apply to your entire body from the neck down. Do not use on your head, face or private parts(genitals). Do not use CHG soap on open sores, wounds or areas of skin irritation. Wash you body gently for 5 minutes. Do not wash your skin too hard. This soap does not create lather. Pay special attention to your underarms and from your belly button to your feet. Turn the shower back on and rinse well to get CHG soap off your body. Pat your skin dry with a clean, dry towel. Do not apply lotions or moisturizer. Put on clean clothes and sleep on fresh bed sheets and do not allow pets to sleep with you.     Shower with CHG soap 2 times before your surgery  The evening before your surgery  The morning of your surgery      Tips to help prevent infections after your surgery:  Protect your surgical wound from germs:  Hand washing is the most important thing you and your caregivers can do to prevent infections. Keep your bandage clean and dry! Do not touch your surgical wound. Use clean, freshly washed towels and washcloths every time you shower; do not share bath linens with others. Until your surgical wound is healed, wear clothing and sleep on bed linens each day that are clean and freshly washed. Do not allow pets to sleep in your bed with you or touch your surgical wound. Do not smoke - smoking delays wound healing. This may be a good time to stop smoking. If you have diabetes, it is important for you to manage your blood sugar levels properly before your surgery as well as after your surgery. Poorly managed blood sugar levels slow down wound healing and prevent you from healing completely. Prevention of Infection  Testing for Staphylococcus aureus on your skin before surgery    Staphylococcus aureus (staph) is a common bacteria that is found on the body. It normally does not cause infection on healthy skin. Before surgery, you will be tested to see if you have staph by swabbing the inside of your nose. When you have an incision with surgery, the goal is to protect that incision from infection. Removal of the staph bacteria before surgery can decrease the risk of a surgical site infection. If your nose swab is positive for staph you will be called. Your treatment will include 2 steps:  Prescription for Mupirocin ointment to be used in each nostril twice a day for 5 days. Showering with Chlorhexidine (CHG) liquid soap for 5 days prior to surgery. How to use Mupirocin ointment in your nose   the prescription from your pharmacy. You will receive a large tube of ointment which will be big enough for all of your treatments. You will apply this ointment to each nostril 2 times a day for 5 days.   Wash your hands with  gel or soap and water for 20 seconds before using ointment. Place a pea-sized amount of ointment on a cotton Q-tip. Apply ointment just inside of each nostril with the Q-tip. Do not push Q-tip or ointment deep inside you nose. Press your nostrils together and massage for a few seconds. Wash your hands with  gel or soap and water after you are finished. Do not get ointment near your eyes. If it gets into your eyes, rinse them with cool water. If you need to use nasal spray, clean the tip of the bottle with alcohol before use and do not use both at the same time. If you are scheduled for COVID testing during the 5 days, do NOT apply morning dose until after the COVID test has been performed. How to use Chlorhexidine (CHG) 4% liquid soap  Purchase an 8 ounce bottle of CHG liquid soap (Chlorhexidine 4%, Hibiclens, Hex-A-Clens or store brand) at a pharmacy or grocery store. Wash your hair with your normal shampoo and your body with regular soap and rinse well to remove shampoo and soap from your skin. Wet a clean washcloth and turn off the shower. Put CHG soap on washcloth and apply to your entire body from the neck down. Do not use on your head, face or private parts(genitals). Do not use CHG soap on open sores, wounds or areas of skin irritation. Wash your body gently for 5 minutes. Do not wash your skin too hard. This soap does not create lather. Pay special attention to your underarms and from your belly button to your feet. Turn the shower back on and rinse well to get CHG soap off your body. Pat your skin dry with a clean, dry towel. Do not apply lotions or moisturizer. Put on clean clothes and sleep on fresh bed sheets the night before surgery. Do not allow pets to sleep with you. Eating and Drinking Before Surgery    You may eat a regular dinner at the usual time on the day before your surgery. Do NOT eat any solid foods after midnight unless your arrival time at the hospital is 3pm or later.   You may drink clear liquids only from 12 midnight until 1 hours prior to your arrival time at the hospital on the day of your surgery. Do NOT drink alcohol. Clear liquids include:  Water  Fruit juices without pulp( i.e. apple juice)  Carbonated beverages  Black coffee (no cream/milk)  Tea (no cream/milk)  Gatorade  You may drink up to 12-16 ounces at one time every 4 hours between the hours of midnight and 1 hour before your arrival time at the hospital. Example- if your arrival time at the hospital is 6am, you may drink 12-16 ounces of clear liquids no later than 5am.  If your arrival time at the hospital is 3pm or later, you may eat a light breakfast before 8am.  A light breakfast includes: Toast or bagel (no butter)  Black coffee (no cream/milk)  Tea (no cream/milk)  Fruit juices without pulp ( i.e. apple juice)  Do NOT eat meat, eggs, vegetables or fruit  If you have any questions, please contact your surgeon's office. Patient Information Regarding COVID Restrictions    Day of Procedure    Please park in the parking deck or any designated visitor parking lot. Enter the facility through the Main Entrance of the hospital.  On the day of surgery, please provide the cell phone number of the person who will be waiting for you to the Patient Access representative at the time of registration. Masks are highly recommended in the hospital, but not required. Once your procedure and the immediate recovery period is completed, a nurse in the recovery area will contact your designated visitor to inform them of your room number or to otherwise review other pertinent information regarding your care. Social distancing practices are strongly encouraged in waiting areas and the cafeteria. The patient was contacted in person. She verbalized understanding of all instructions does not  need reinforcement.

## 2022-12-08 NOTE — PERIOP NOTES
[facility], DR Maurice Alexander. DR TAVERSA'S OFFICE CALLED BUT OFFICE CLOSED AT 1300 TODAY. LEFT MESSAGE TO REQUEST LABS BE SENT. LABS DRAWN TODAY AT PAT APPOINTMENT. CALLED ASHLEY CORCORAN'S OFFICE TO REQUEST EKG AND OFFICE NOTES. THEY WILL FAX. RECEIVED AND PLACED ON CHART. COPY OF COVID VACCINE CARD PLACED ON CHART. PT DECLINES TO STAY AT THE [facility].

## 2022-12-09 LAB
BACTERIA SPEC CULT: NORMAL
BACTERIA SPEC CULT: NORMAL
SERVICE CMNT-IMP: NORMAL

## 2022-12-09 NOTE — PERIOP NOTES
SENT NOTE TO DERECK , NURSE FOR DR. Rosalva Huddleston VIA CC. ABDIFATAH HARDEN,    THIS PATIENTS SURGERY WAS ORIGINALLY SCHEDULED FOR January 2023 AND WHEN YOU FAXED HER ADDITIONAL LAB ORDERS  FOR SED RATE AND C REACTIVE PROTEIN, THOSE ORDERS WERE FILED IN OUR OFFICE UNDER THAT SURGERY DATE, SINCE NOONE NOTIFIED US THAT HER SURGERY WAS MOVED UP TO 12/21/22 , THOSE ORDERS WERE MISSED WHEN SHE CAME IN FOR HER P.A.T. APPT YESTERDAY 12/8/22. THE PATIENT  LIVES IN [place], [place] AND TRANSPORTATION IS AN ISSUE FOR HER TO COME BACK TO BE DRAWN FOR THOSE LABS THAT WERE MISSED. THE PATIENT IS ASKING SINCE SHE GOES TO [place] [facility]  ONCE A WEEK FOR PT/INR WITH DR. Hernan Rae, SHE IS ASKING IF YOU ALL CAN FAX ORDERS FOR THE SED RATE AND C REACTIVE PROTEIN TO BE DRAWN NEXT Tuesday 12/13/22 WHEN SHE IS DUE TO GO GET HER PT/INR TO SAVE HER ANOTHER TRIP TO [place]. IS THIS POSSIBLE? CAN YOU REACH OUT TO THE PATIENT?     [address]   PRE ADMIT TESTING AT [facility] [address]

## 2022-12-09 NOTE — PERIOP NOTES
CALLED PATIENT TO SCHEDULE LAB WORK THAT SURGEONS OFFICE REQUESTED THAT WAS FILED UNDER ORIGINAL SURGERY DATE IN January SO WHEN PATIENT CAME IN FOR P.A.T. ON 12/8/22, THAT LAB WORK WAS NOT UNDER NEW SURGERY DATE SO LAB WAS MISSED IN P.A. T. PATIENT WILL BE CALLING HER CHILDREN TO ARRANGE TRANSPORTATION FROM [place] AND THEN CALL ME BACK WITH THE DATE SHE IS ABLE TO GET A RIDE TO HER LAB DRAW HERE IN P.A.T.     [id number]- SPOKE WITH DERECK IN DR. KNAPP'S OFFICE AND SED RATE AND C REACTIVE PROTEIN WERE DONE IN P.A.T.  ON 12/8/22

## 2022-12-12 LAB
ATRIAL RATE: 85 BPM
CALCULATED P AXIS, ECG09: 47 DEGREES
CALCULATED R AXIS, ECG10: -6 DEGREES
CALCULATED T AXIS, ECG11: 22 DEGREES
DIAGNOSIS, 93000: NORMAL
P-R INTERVAL, ECG05: 176 MS
Q-T INTERVAL, ECG07: 376 MS
QRS DURATION, ECG06: 80 MS
QTC CALCULATION (BEZET), ECG08: 447 MS
VENTRICULAR RATE, ECG03: 85 BPM

## 2022-12-19 NOTE — PERIOP NOTES
PAT Nurse Practitioner   Pre-Operative Chart Review/Assessment:-ORTHOPEDIC                Patient Name:  Taylor Orozco                                                           Age:   82 y.o.    :  1935     Today's Date:  2022     Date of PAT:   22      Date of Surgery:    22      Procedure(s):  Left Total Knee Arthroplasty REVISION     Surgeon:   Dr. Grace Linares                       PLAN:      1)  Medical Clearance/PCP:  Jagdish Angelo MD      2)  Cardiac Clearance:  Pt followed by Dr. Wills(S). LOV 10/21/22. Clearance obtained on 22. OV note, EKG, ECHO, stress test and clearance letter on chart and scanned under media. 3)  Diabetic Treatment Consult:  Not indicated. A1c-5.8      4)  Sleep Apnea evaluation:   Not indicated.  VERONICA Score 2.       5) Treatment for MRSA/Staph Aureus:  Neg      6) Additional Concerns:  HTN, GERD, PAF, hx of lumbar fusion                Vital Signs:         Vitals:    22 1355   BP: 130/69   Pulse: (!) 111   Resp: 18   Temp: 97.5 °F (36.4 °C)   Weight: 86 kg (189 lb 9.5 oz)   Height: 5' 4\" (1.626 m)          Body mass index is 32.54 kg/m².         ____________________________________________  PAST MEDICAL HISTORY  Past Medical History:   Diagnosis Date    Adverse effect of anesthesia     \"lost\" 4 days on Morphine-BACK SURGERY    Arrhythmia 2011    DR Christin Glass DCd DILTIAZEM DT NO ISSUES , PT STATES SHE WENT INTO AFIB DURING RIGHT KNEE SURGERY IN     Arthritis     Chronic pain     BOTH KNEES    GERD (gastroesophageal reflux disease)     CONTROLLED WITH OMEPRAZOLE    Hypertension     Ill-defined condition     constipation    Sebaceous cyst 2014    Snores       ____________________________________________  PAST SURGICAL HISTORY  Past Surgical History:   Procedure Laterality Date    HX APPENDECTOMY      HX BREAST BIOPSY      HX BREAST REDUCTION  2006    HX CATARACT REMOVAL Bilateral     [facility]    HX COLONOSCOPY      HX CYST INCISION AND DRAINAGE Left 11/15/2018    excision of sebaceous cyst left shoulder     HX DILATION AND CURETTAGE  1973    HX GI      appendectomy    HX GI  1975    hemorrhoidectomy    HX HEENT      neck surgery cervical discectomy    HX HERNIA REPAIR  0839    UMBILICAL    HX HYSTERECTOMY  1976    VAGINAL    HX KNEE REPLACEMENT Left 08/11/2016    HX MOHS PROCEDURES  2005    right shoulder    HX ORTHOPAEDIC  1991    left leg/ankle fx repair    HX ORTHOPAEDIC  2004    right BUNIONECTOMY    HX ORTHOPAEDIC  2009    RIGHT HAMMERTOE    HX ORTHOPAEDIC  2015    R TOTAL KNEE    HX ORTHOPAEDIC  2012    back fusion    HX OTHER SURGICAL  05/15/2013    surgery on neck glands/Parathyroid removed    HX OTHER SURGICAL  04/14/2014    cyst removed from back    HX PARATHYROIDECTOMY  2013    HX UROLOGICAL  2012    lithotrypsy , 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       ____________________________________________  HOME MEDICATIONS  Current Outpatient Medications   Medication Sig    isosorbide mononitrate ER (IMDUR) 30 mg tablet Take 30 mg by mouth every morning. polyethylene glycol (Miralax) 17 gram/dose powder Take 17 g by mouth daily. cholecalciferol, vitamin D3, (VITAMIN D3 PO) Take 1 Tablet by mouth daily. multivitamin (ONE A DAY) tablet Take 1 Tablet by mouth every evening. melatonin 5 mg tablet Take 5 mg by mouth nightly. famotidine (PEPCID) 40 mg tablet Take 40 mg by mouth every evening. metoprolol tartrate (LOPRESSOR) 25 mg tablet Take 50 mg by mouth daily. warfarin (COUMADIN) 5 mg tablet Take 2.5 mg by mouth every evening. pravastatin (PRAVACHOL) 40 mg tablet Take 40 mg by mouth nightly. furosemide (LASIX) 40 mg tablet Take 40 mg by mouth daily.  Indications: SWELLING IN ANKLES; LEFT SWELLS MORE FOLLOWING FRACTURES     No current facility-administered medications for this encounter.      ____________________________________________  ALLERGIES  Allergies   Allergen Reactions    Codeine Nausea and Vomiting    Demerol [Meperidine] Palpitations and Other (comments)     \"made me hyper\"    Morphine Other (comments)     \"disoriented\"    Penicillin G Hives and Swelling     EYE SWELLING  Patient screened for any delayed non-IgE-mediated reaction to PCN. Patient notes the following:    No delayed non-IgE-mediated reaction to PCN               ____________________________________________  SOCIAL HISTORY  Social History     Tobacco Use    Smoking status: Never    Smokeless tobacco: Never   Substance Use Topics    Alcohol use: No      ____________________________________________   Internal Administration   First Dose COVID-19, MODERNA BLUE border, Primary or Immunocompromised, (age 18y+), IM, 100 mcg/0.5mL  02/26/2021   Second Dose COVID-19, MODERNA BLUE border, Primary or Immunocompromised, (age 18y+), IM, 100 mcg/0.5mL  03/25/2021      Last COVID Lab No results found for: Justyn Last 68, CVD2M, [facility], XPLCVT, [address] [facility], [address] [place] [address], [address] [place] [address], [address] [facility], [address], [address] [place] [address]                 Labs:     Hospital Outpatient Visit on 12/08/2022   Component Date Value Ref Range Status    C-Reactive protein 12/08/2022 0.50  0.00 - 0.60 mg/dL Final    Comment: CRP is a nonspecific acute phase reactant that shows rapid, marked increases with inflammation, infection, trauma, tissue necrosis, malignancies and autoimmune diseases. Sequential CRP levels are useful in monitoring response to antibacterial therapy. This assay is not equivalent to the hsCRP test since the presence of one or more of the foregoing disease processes obviates the risk stratification information available from hsCRP testing.       Sed rate, automated 12/08/2022 21  0 - 30 mm/hr Final    Sodium 12/08/2022 142  136 - 145 mmol/L Final    Potassium 12/08/2022 3.5  3.5 - 5.1 mmol/L Final    Chloride 12/08/2022 106  97 - 108 mmol/L Final    CO2 12/08/2022 32  21 - 32 mmol/L Final    Anion gap 12/08/2022 4 (A)  5 - 15 mmol/L Final Glucose 12/08/2022 136 (A)  65 - 100 mg/dL Final    BUN 12/08/2022 18  6 - 20 MG/DL Final    Creatinine 12/08/2022 1.21 (A)  0.55 - 1.02 MG/DL Final    BUN/Creatinine ratio 12/08/2022 15  12 - 20   Final    eGFR 12/08/2022 44 (A)  >60 ml/min/1.73m2 Final    Comment:      Pediatric calculator link: Melanie.at. org/professionals/kdoqi/gfr_calculatorped       These results are not intended for use in patients <25years of age. eGFR results are calculated without a race factor using  the 2021 CKD-EPI equation. Careful clinical correlation is recommended, particularly when comparing to results calculated using previous equations. The CKD-EPI equation is less accurate in patients with extremes of muscle mass, extra-renal metabolism of creatinine, excessive creatine ingestion, or following therapy that affects renal tubular secretion. Calcium 12/08/2022 9.1  8.5 - 10.1 MG/DL Final    WBC 12/08/2022 6.7  3.6 - 11.0 K/uL Final    RBC 12/08/2022 4.53  3.80 - 5.20 M/uL Final    HGB 12/08/2022 13.5  11.5 - 16.0 g/dL Final    HCT 12/08/2022 41.7  35.0 - 47.0 % Final    MCV 12/08/2022 92.1  80.0 - 99.0 FL Final    MCH 12/08/2022 29.8  26.0 - 34.0 PG Final    MCHC 12/08/2022 32.4  30.0 - 36.5 g/dL Final    RDW 12/08/2022 13.2  11.5 - 14.5 % Final    PLATELET 22/30/6526 081  150 - 400 K/uL Final    MPV 12/08/2022 10.2  8.9 - 12.9 FL Final    NRBC 12/08/2022 0.0  0  WBC Final    ABSOLUTE NRBC 12/08/2022 0.00  0.00 - 0.01 K/uL Final    Crossmatch Expiration 12/08/2022 12/22/2022,2359   Final    ABO/Rh(D) 12/08/2022 A POSITIVE   Final    Antibody screen 12/08/2022 NEG   Final    INR 12/08/2022 2.6 (A)  0.9 - 1.1   Final    A single therapeutic range for Vit K antagonists may not be optimal for all indications - see June, 2008 issue of Chest, American College of Chest Physicians Evidence-Based Clinical Practice Guidelines, 8th Edition.     Prothrombin time 12/08/2022 25.7 (A)  9.0 - 11.1 sec Final    Color 12/08/2022 YELLOW/STRAW    Final    Color Reference Range: Straw, Yellow or Dark Yellow    Appearance 12/08/2022 CLEAR  CLEAR   Final    Specific gravity 12/08/2022 1.011  1.003 - 1.030   Final    pH (UA) 12/08/2022 5.5  5.0 - 8.0   Final    Protein 12/08/2022 Negative  NEG mg/dL Final    Glucose 12/08/2022 Negative  NEG mg/dL Final    Ketone 12/08/2022 Negative  NEG mg/dL Final    Bilirubin 12/08/2022 Negative  NEG   Final    Blood 12/08/2022 Negative  NEG   Final    Urobilinogen 12/08/2022 0.2  0.2 - 1.0 EU/dL Final    Nitrites 12/08/2022 Negative  NEG   Final    Leukocyte Esterase 12/08/2022 Negative  NEG   Final    UA:UC IF INDICATED 12/08/2022 CULTURE NOT INDICATED BY UA RESULT  CNI   Final    WBC 12/08/2022 0-4  0 - 4 /hpf Final    RBC 12/08/2022 0-5  0 - 5 /hpf Final    Epithelial cells 12/08/2022 FEW  FEW /lpf Final    Epithelial cell category consists of squamous cells and /or transitional urothelial cells. Renal tubular cells, if present, are separately identified as such.     Bacteria 12/08/2022 Negative  NEG /hpf Final    Hyaline cast 12/08/2022 0-2  0 - 5 /lpf Final    Ventricular Rate 12/08/2022 85  BPM Final    Atrial Rate 12/08/2022 85  BPM Final    P-R Interval 12/08/2022 176  ms Final    QRS Duration 12/08/2022 80  ms Final    Q-T Interval 12/08/2022 376  ms Final    QTC Calculation (Bezet) 12/08/2022 447  ms Final    Calculated P Axis 12/08/2022 47  degrees Final    Calculated R Axis 12/08/2022 -6  degrees Final    Calculated T Axis 12/08/2022 22  degrees Final    Diagnosis 12/08/2022    Final                    Value:Normal sinus rhythm  Low voltage QRS  Nonspecific ST abnormality  Abnormal ECG  When compared with ECG of 11-AUG-2016 10:03,  Sinus rhythm has replaced Atrial fibrillation  Nonspecific T wave abnormality, improved in Inferior leads  Confirmed by Anupam Sparrow MD. ([id number]) on 12/12/2022 12:04:12 AM      Hemoglobin A1c 12/08/2022 5.8 (A)  4.0 - 5.6 % Final    Comment: NEW METHOD  PLEASE NOTE NEW REFERENCE RANGE  (NOTE)  HbA1C Interpretive Ranges  <5.7              Normal  5.7 - 6.4         Consider Prediabetes  >6.5              Consider Diabetes      Est. average glucose 12/08/2022 120  mg/dL Final    Special Requests: 12/08/2022 NO SPECIAL REQUESTS    Final    Culture result: 12/08/2022 MRSA NOT PRESENT    Final    Culture result: 12/08/2022     Final                    Value:Screening of patient nares for MRSA is for surveillance purposes and, if positive, to facilitate isolation considerations in high risk settings. It is not intended for automatic decolonization interventions per se as regimens are not sufficiently effective to warrant routine use. Skin:     Denies open wounds, cuts, sores, rashes or other areas of concern in PAT assessment.           Christina Carballo NP  Available via [employer]

## 2022-12-20 RX ORDER — SODIUM CHLORIDE 9 MG/ML
1000 INJECTION, SOLUTION INTRAVENOUS CONTINUOUS
Status: CANCELLED | OUTPATIENT
Start: 2022-12-20

## 2022-12-20 RX ORDER — SODIUM CHLORIDE 0.9 % (FLUSH) 0.9 %
5-40 SYRINGE (ML) INJECTION EVERY 8 HOURS
Status: CANCELLED | OUTPATIENT
Start: 2022-12-20

## 2022-12-20 RX ORDER — SODIUM CHLORIDE, SODIUM LACTATE, POTASSIUM CHLORIDE, CALCIUM CHLORIDE 600; 310; 30; 20 MG/100ML; MG/100ML; MG/100ML; MG/100ML
125 INJECTION, SOLUTION INTRAVENOUS CONTINUOUS
Status: CANCELLED | OUTPATIENT
Start: 2022-12-20

## 2022-12-20 RX ORDER — OXYCODONE AND ACETAMINOPHEN 5; 325 MG/1; MG/1
1 TABLET ORAL AS NEEDED
Status: CANCELLED | OUTPATIENT
Start: 2022-12-20

## 2022-12-20 RX ORDER — MIDAZOLAM HYDROCHLORIDE 1 MG/ML
0.5 INJECTION, SOLUTION INTRAMUSCULAR; INTRAVENOUS
Status: CANCELLED | OUTPATIENT
Start: 2022-12-20

## 2022-12-20 RX ORDER — DIPHENHYDRAMINE HYDROCHLORIDE 50 MG/ML
12.5 INJECTION, SOLUTION INTRAMUSCULAR; INTRAVENOUS AS NEEDED
Status: CANCELLED | OUTPATIENT
Start: 2022-12-20 | End: 2022-12-20

## 2022-12-20 RX ORDER — FENTANYL CITRATE 50 UG/ML
25 INJECTION, SOLUTION INTRAMUSCULAR; INTRAVENOUS
Status: CANCELLED | OUTPATIENT
Start: 2022-12-20

## 2022-12-20 RX ORDER — HYDROMORPHONE HYDROCHLORIDE 1 MG/ML
0.2 INJECTION, SOLUTION INTRAMUSCULAR; INTRAVENOUS; SUBCUTANEOUS
Status: CANCELLED | OUTPATIENT
Start: 2022-12-20

## 2022-12-20 RX ORDER — SODIUM CHLORIDE 0.9 % (FLUSH) 0.9 %
5-40 SYRINGE (ML) INJECTION AS NEEDED
Status: CANCELLED | OUTPATIENT
Start: 2022-12-20

## 2022-12-20 RX ORDER — ONDANSETRON 2 MG/ML
4 INJECTION INTRAMUSCULAR; INTRAVENOUS AS NEEDED
Status: CANCELLED | OUTPATIENT
Start: 2022-12-20

## 2022-12-21 ENCOUNTER — HOSPITAL ENCOUNTER (OUTPATIENT)
Age: 87
Setting detail: OUTPATIENT SURGERY
Discharge: HOME | End: 2022-12-21
Attending: ORTHOPAEDIC SURGERY | Admitting: ORTHOPAEDIC SURGERY
Payer: MEDICARE

## 2022-12-21 ENCOUNTER — ANESTHESIA (OUTPATIENT)
Dept: SURGERY | Age: 87
End: 2022-12-21
Payer: MEDICARE

## 2022-12-21 ENCOUNTER — ANESTHESIA EVENT (OUTPATIENT)
Dept: SURGERY | Age: 87
End: 2022-12-21
Payer: MEDICARE

## 2022-12-21 VITALS
SYSTOLIC BLOOD PRESSURE: 113 MMHG | WEIGHT: 189.6 LBS | HEART RATE: 70 BPM | OXYGEN SATURATION: 98 % | RESPIRATION RATE: 16 BRPM | DIASTOLIC BLOOD PRESSURE: 69 MMHG | HEIGHT: 64 IN | BODY MASS INDEX: 32.37 KG/M2 | TEMPERATURE: 97.9 F

## 2022-12-21 PROBLEM — T84.033A MECHANICAL LOOSENING OF INTERNAL LEFT KNEE PROSTHETIC JOINT, INITIAL ENCOUNTER (HCC): Status: ACTIVE | Noted: 2022-12-21

## 2022-12-21 LAB
INR BLD: 1.9 (ref 0.9–1.2)
INR BLD: 2 (ref 0.9–1.2)

## 2022-12-21 PROCEDURE — 85610 PROTHROMBIN TIME: CPT

## 2022-12-21 PROCEDURE — 74011250636 HC RX REV CODE- 250/636: Performed by: ANESTHESIOLOGY

## 2022-12-21 RX ORDER — ISOSORBIDE MONONITRATE 30 MG/1
30 TABLET, EXTENDED RELEASE ORAL
Status: CANCELLED | OUTPATIENT
Start: 2022-12-21

## 2022-12-21 RX ORDER — ACETAMINOPHEN 325 MG/1
650 TABLET ORAL ONCE
Status: DISCONTINUED | OUTPATIENT
Start: 2022-12-21 | End: 2022-12-21 | Stop reason: SDUPTHER

## 2022-12-21 RX ORDER — PRAVASTATIN SODIUM 40 MG/1
40 TABLET ORAL
Status: CANCELLED | OUTPATIENT
Start: 2022-12-21

## 2022-12-21 RX ORDER — LIDOCAINE HYDROCHLORIDE 10 MG/ML
0.1 INJECTION, SOLUTION EPIDURAL; INFILTRATION; INTRACAUDAL; PERINEURAL AS NEEDED
Status: DISCONTINUED | OUTPATIENT
Start: 2022-12-21 | End: 2022-12-21 | Stop reason: HOSPADM

## 2022-12-21 RX ORDER — MIDAZOLAM HYDROCHLORIDE 1 MG/ML
1 INJECTION, SOLUTION INTRAMUSCULAR; INTRAVENOUS AS NEEDED
Status: DISCONTINUED | OUTPATIENT
Start: 2022-12-21 | End: 2022-12-21 | Stop reason: HOSPADM

## 2022-12-21 RX ORDER — SODIUM CHLORIDE, SODIUM LACTATE, POTASSIUM CHLORIDE, CALCIUM CHLORIDE 600; 310; 30; 20 MG/100ML; MG/100ML; MG/100ML; MG/100ML
125 INJECTION, SOLUTION INTRAVENOUS CONTINUOUS
Status: DISCONTINUED | OUTPATIENT
Start: 2022-12-21 | End: 2022-12-21 | Stop reason: HOSPADM

## 2022-12-21 RX ORDER — HYDROXYZINE HYDROCHLORIDE 10 MG/1
10 TABLET, FILM COATED ORAL
Status: CANCELLED | OUTPATIENT
Start: 2022-12-21

## 2022-12-21 RX ORDER — ACETAMINOPHEN 500 MG
500 TABLET ORAL
Status: CANCELLED | OUTPATIENT
Start: 2022-12-21

## 2022-12-21 RX ORDER — SODIUM CHLORIDE 9 MG/ML
50 INJECTION, SOLUTION INTRAVENOUS CONTINUOUS
Status: DISCONTINUED | OUTPATIENT
Start: 2022-12-21 | End: 2022-12-21 | Stop reason: HOSPADM

## 2022-12-21 RX ORDER — POLYETHYLENE GLYCOL 3350 17 G/17G
17 POWDER, FOR SOLUTION ORAL DAILY
Status: CANCELLED | OUTPATIENT
Start: 2022-12-21

## 2022-12-21 RX ORDER — ONDANSETRON 2 MG/ML
4 INJECTION INTRAMUSCULAR; INTRAVENOUS
Status: CANCELLED | OUTPATIENT
Start: 2022-12-21 | End: 2022-12-22

## 2022-12-21 RX ORDER — SODIUM CHLORIDE 0.9 % (FLUSH) 0.9 %
5-40 SYRINGE (ML) INJECTION AS NEEDED
Status: CANCELLED | OUTPATIENT
Start: 2022-12-21

## 2022-12-21 RX ORDER — OXYCODONE HYDROCHLORIDE 5 MG/1
2.5 TABLET ORAL
Status: CANCELLED | OUTPATIENT
Start: 2022-12-21

## 2022-12-21 RX ORDER — SODIUM CHLORIDE 0.9 % (FLUSH) 0.9 %
5-40 SYRINGE (ML) INJECTION EVERY 8 HOURS
Status: DISCONTINUED | OUTPATIENT
Start: 2022-12-21 | End: 2022-12-21 | Stop reason: HOSPADM

## 2022-12-21 RX ORDER — WARFARIN 2.5 MG/1
2.5 TABLET ORAL EVERY EVENING
Status: CANCELLED | OUTPATIENT
Start: 2022-12-21

## 2022-12-21 RX ORDER — FENTANYL CITRATE 50 UG/ML
50 INJECTION, SOLUTION INTRAMUSCULAR; INTRAVENOUS AS NEEDED
Status: DISCONTINUED | OUTPATIENT
Start: 2022-12-21 | End: 2022-12-21 | Stop reason: HOSPADM

## 2022-12-21 RX ORDER — SODIUM CHLORIDE 0.9 % (FLUSH) 0.9 %
5-40 SYRINGE (ML) INJECTION AS NEEDED
Status: DISCONTINUED | OUTPATIENT
Start: 2022-12-21 | End: 2022-12-21 | Stop reason: HOSPADM

## 2022-12-21 RX ORDER — PREGABALIN 75 MG/1
75 CAPSULE ORAL ONCE
Status: DISCONTINUED | OUTPATIENT
Start: 2022-12-21 | End: 2022-12-21 | Stop reason: HOSPADM

## 2022-12-21 RX ORDER — NALOXONE HYDROCHLORIDE 0.4 MG/ML
0.4 INJECTION, SOLUTION INTRAMUSCULAR; INTRAVENOUS; SUBCUTANEOUS AS NEEDED
Status: CANCELLED | OUTPATIENT
Start: 2022-12-21

## 2022-12-21 RX ORDER — SODIUM CHLORIDE 9 MG/ML
125 INJECTION, SOLUTION INTRAVENOUS CONTINUOUS
Status: CANCELLED | OUTPATIENT
Start: 2022-12-21 | End: 2022-12-22

## 2022-12-21 RX ORDER — ACETAMINOPHEN 500 MG
1000 TABLET ORAL ONCE
Status: DISCONTINUED | OUTPATIENT
Start: 2022-12-21 | End: 2022-12-21 | Stop reason: HOSPADM

## 2022-12-21 RX ORDER — METOPROLOL TARTRATE 50 MG/1
50 TABLET ORAL DAILY
Status: CANCELLED | OUTPATIENT
Start: 2022-12-21

## 2022-12-21 RX ORDER — FAMOTIDINE 20 MG/1
40 TABLET, FILM COATED ORAL EVERY EVENING
Status: CANCELLED | OUTPATIENT
Start: 2022-12-21

## 2022-12-21 RX ORDER — HYDROMORPHONE HYDROCHLORIDE 1 MG/ML
0.5 INJECTION, SOLUTION INTRAMUSCULAR; INTRAVENOUS; SUBCUTANEOUS
Status: CANCELLED | OUTPATIENT
Start: 2022-12-21 | End: 2022-12-22

## 2022-12-21 RX ORDER — SODIUM CHLORIDE 0.9 % (FLUSH) 0.9 %
5-40 SYRINGE (ML) INJECTION EVERY 8 HOURS
Status: CANCELLED | OUTPATIENT
Start: 2022-12-21

## 2022-12-21 RX ORDER — FACIAL-BODY WIPES
10 EACH TOPICAL DAILY PRN
Status: CANCELLED | OUTPATIENT
Start: 2022-12-23

## 2022-12-21 RX ORDER — LANOLIN ALCOHOL/MO/W.PET/CERES
5 CREAM (GRAM) TOPICAL
Status: CANCELLED | OUTPATIENT
Start: 2022-12-21

## 2022-12-21 RX ORDER — OXYCODONE HYDROCHLORIDE 5 MG/1
5 TABLET ORAL
Status: CANCELLED | OUTPATIENT
Start: 2022-12-21

## 2022-12-21 RX ORDER — AMOXICILLIN 250 MG
1 CAPSULE ORAL 2 TIMES DAILY
Status: CANCELLED | OUTPATIENT
Start: 2022-12-21

## 2022-12-21 RX ADMIN — FENTANYL CITRATE 25 MCG: 50 INJECTION INTRAMUSCULAR; INTRAVENOUS at 11:55

## 2022-12-21 RX ADMIN — MIDAZOLAM 2 MG: 1 INJECTION INTRAMUSCULAR; INTRAVENOUS at 11:55

## 2022-12-21 NOTE — PROGRESS NOTES
Ms. Kingsley Hinkle presented for revision left total knee replacement today. She is on warfarin long-term for atrial fibrillation. She stopped the medication as instructed 5 days ago. However, INR today remains elevated. First check at the bedside was 1.9. Repeat was performed to be sure, and was 2.0. Surgery will have to be canceled today. We will squeeze her into the schedule in January. Plan at that time will be for her to stop her warfarin at least 8 or 9 days prior to surgery, and have it checked at home the day before the procedure. She is disappointed, but understands.     Megan Hussein MD

## 2022-12-21 NOTE — DISCHARGE INSTRUCTIONS
Post-op Discharge Instructions Following Total Joint Replacement  Emili Mahajan MD  [facility]  [phone]  Follow-up Office Visit  See Dr. Amado Campbell approximately 3-4 weeks from date of surgery. Call [phone] to make an appointment. Call Ayesha Carbajal LPN if you have questions or concerns, [phone]. Activity  Use your walker for ambulation. Weight bearing as tolerated unless instructed otherwise by the physical therapist. Get up every hour you are awake and take a brief walk. Lengthen walking distance daily as your strength improves. Continue using your walker until seen in the office for your first follow up visit. Practice your exercises 3 times daily as instructed by the physical therapist. Kirstin Awe for 20 minutes after exercising. No driving until seen in the office for your first follow up visit. Incision Care  The light brown Aquacel surgical dressing is waterproof and is to remain on your incision for 7 days. On the 7th day, carefully lift the edge of the dressing to break the adhesive seal and gently peel it off. If your Aquacel dressings comes loose or falls off before the 7th day, replace it with a dry sterile gauze dressing and change this dressing daily. Once there is no drainage on the bandage, you mean leave the incision open to air. You may take a shower with the Aquacel dressing in place. After you remove the Aquacel dressing on day 7, you may continue to shower and get your incision wet in the shower. Do not submerge your incision under water in a bathtub, hot tub, swimming pool, etc. until after you have been evaluated at your first office visit. Medications  Blood Clot Prevention: Take medication as prescribed by your physician for 4 weeks postop. Pain Management: Take pain medication as prescribed; wean yourself off of pain medication as your pain lessens. Take with food. You make also take Tylenol every 4-6 hours as needed for pain.   Do not exceed 3 grams (3000mg) per day. Place an ice bag on or around the incision for 20 minutes on / 20 minutes off as needed throughout the day and night, especially after exercising. Stool Softener: You may want to take a stool softener (such as Senokot-S or Colace) to prevent constipation while taking pain medication. If constipation occurs, you may also use a laxative (such as Dulcolax tablets, Miralax, or a suppository). Diet  Resume usual diet at home. Drink plenty of fluids. Eat foods high in fiber and protein. Calcium and Vitamin D supplements recommended. Avoid alcoholic beverages. No smoking. When to call your Orthopaedic Surgeon: If you call after 5pm or on a weekend, the on call physician will return your call  Pain that is not relieved by pain medication, ice, and activity modification  Signs of infection (red incision, continuous drainage from the incision, malodorous drainage, persistent fever greater than 101 degrees Fahrenheit)  Signs of a blood clot in your leg (calf pain, tenderness, redness, and/or swelling of the lower leg)  ?   When to call your Primary Care Physician  Concerns about your medical conditions such as diabetes, high blood pressure, asthma, congestive heart failure  Call if blood sugars are elevated, if you have a persistent headache or dizziness, coughing or congestion, constipation or diarrhea, burning with urination, abnormal heart rate (fast or slow)  When to call 911 and go to the nearest Emergency Room  Acute onset of chest pain, shortness of breath, difficulty breathing

## 2022-12-21 NOTE — ANESTHESIA PREPROCEDURE EVALUATION
Relevant Problems   No relevant active problems       Anesthetic History   No history of anesthetic complications            Review of Systems / Medical History  Patient summary reviewed, nursing notes reviewed and pertinent labs reviewed    Pulmonary  Within defined limits                 Neuro/Psych   Within defined limits           Cardiovascular  Within defined limits  Hypertension        Dysrhythmias            GI/Hepatic/Renal  Within defined limits   GERD           Endo/Other  Within defined limits      Arthritis     Other Findings              Physical Exam    Airway  Mallampati: II  TM Distance: > 6 cm  Neck ROM: normal range of motion   Mouth opening: Normal     Cardiovascular  Regular rate and rhythm,  S1 and S2 normal,  no murmur, click, rub, or gallop             Dental  No notable dental hx       Pulmonary  Breath sounds clear to auscultation               Abdominal  GI exam deferred       Other Findings            Anesthetic Plan    ASA: 3  Anesthesia type: general      Post-op pain plan if not by surgeon: peripheral nerve block single    Induction: Intravenous  Anesthetic plan and risks discussed with: Patient

## 2023-01-12 ENCOUNTER — TRANSCRIBE ORDER (OUTPATIENT)
Dept: SCHEDULING | Age: 88
End: 2023-01-12

## 2023-01-12 DIAGNOSIS — Z12.31 ENCOUNTER FOR SCREENING MAMMOGRAM FOR MALIGNANT NEOPLASM OF BREAST: Primary | ICD-10-CM

## 2023-01-17 ENCOUNTER — ANESTHESIA EVENT (OUTPATIENT)
Dept: SURGERY | Age: 88
DRG: 468 | End: 2023-01-17
Payer: MEDICARE

## 2023-01-18 ENCOUNTER — ANESTHESIA (OUTPATIENT)
Dept: SURGERY | Age: 88
DRG: 468 | End: 2023-01-18
Payer: MEDICARE

## 2023-01-18 ENCOUNTER — HOSPITAL ENCOUNTER (INPATIENT)
Age: 88
LOS: 2 days | Discharge: NURSING, REHAB OR LONG-TERM CARE | DRG: 468 | End: 2023-01-20
Attending: ORTHOPAEDIC SURGERY | Admitting: ORTHOPAEDIC SURGERY
Payer: MEDICARE

## 2023-01-18 DIAGNOSIS — T84.033A MECHANICAL LOOSENING OF INTERNAL LEFT KNEE PROSTHETIC JOINT, INITIAL ENCOUNTER (HCC): Primary | ICD-10-CM

## 2023-01-18 DIAGNOSIS — Z96.652 S/P REVISION OF TOTAL KNEE, LEFT: ICD-10-CM

## 2023-01-18 LAB
GLUCOSE BLD STRIP.AUTO-MCNC: 102 MG/DL (ref 65–117)
SERVICE CMNT-IMP: NORMAL

## 2023-01-18 PROCEDURE — C1776 JOINT DEVICE (IMPLANTABLE): HCPCS | Performed by: ORTHOPAEDIC SURGERY

## 2023-01-18 PROCEDURE — 88305 TISSUE EXAM BY PATHOLOGIST: CPT

## 2023-01-18 PROCEDURE — C1713 ANCHOR/SCREW BN/BN,TIS/BN: HCPCS | Performed by: ORTHOPAEDIC SURGERY

## 2023-01-18 PROCEDURE — 77030008462 HC STPLR SKN PROX J&J -A: Performed by: ORTHOPAEDIC SURGERY

## 2023-01-18 PROCEDURE — 77030010507 HC ADH SKN DERMBND J&J -B: Performed by: ORTHOPAEDIC SURGERY

## 2023-01-18 PROCEDURE — 76010000174 HC OR TIME 3.5 TO 4 HR INTENSV-TIER 1: Performed by: ORTHOPAEDIC SURGERY

## 2023-01-18 PROCEDURE — 82962 GLUCOSE BLOOD TEST: CPT

## 2023-01-18 PROCEDURE — 77030002933 HC SUT MCRYL J&J -A: Performed by: ORTHOPAEDIC SURGERY

## 2023-01-18 PROCEDURE — 77030007866 HC KT SPN ANES BBMI -B: Performed by: ANESTHESIOLOGY

## 2023-01-18 PROCEDURE — 74011000250 HC RX REV CODE- 250: Performed by: PHYSICIAN ASSISTANT

## 2023-01-18 PROCEDURE — 77030018547 HC SUT ETHBND1 J&J -B: Performed by: ORTHOPAEDIC SURGERY

## 2023-01-18 PROCEDURE — 88331 PATH CONSLTJ SURG 1 BLK 1SPC: CPT

## 2023-01-18 PROCEDURE — 74011000250 HC RX REV CODE- 250: Performed by: NURSE ANESTHETIST, CERTIFIED REGISTERED

## 2023-01-18 PROCEDURE — 74011250637 HC RX REV CODE- 250/637: Performed by: PHYSICIAN ASSISTANT

## 2023-01-18 PROCEDURE — 51798 US URINE CAPACITY MEASURE: CPT

## 2023-01-18 PROCEDURE — 74011000250 HC RX REV CODE- 250: Performed by: ANESTHESIOLOGY

## 2023-01-18 PROCEDURE — 76060000038 HC ANESTHESIA 3.5 TO 4 HR: Performed by: ORTHOPAEDIC SURGERY

## 2023-01-18 PROCEDURE — 74011250636 HC RX REV CODE- 250/636: Performed by: NURSE ANESTHETIST, CERTIFIED REGISTERED

## 2023-01-18 PROCEDURE — 2709999900 HC NON-CHARGEABLE SUPPLY: Performed by: ORTHOPAEDIC SURGERY

## 2023-01-18 PROCEDURE — 27487 REVISE/REPLACE KNEE JOINT: CPT | Performed by: ORTHOPAEDIC SURGERY

## 2023-01-18 PROCEDURE — 87205 SMEAR GRAM STAIN: CPT

## 2023-01-18 PROCEDURE — 77030006835 HC BLD SAW SAG STRY -B: Performed by: ORTHOPAEDIC SURGERY

## 2023-01-18 PROCEDURE — 74011250636 HC RX REV CODE- 250/636: Performed by: ANESTHESIOLOGY

## 2023-01-18 PROCEDURE — 27487 REVISE/REPLACE KNEE JOINT: CPT | Performed by: PHYSICIAN ASSISTANT

## 2023-01-18 PROCEDURE — 77030031139 HC SUT VCRL2 J&J -A: Performed by: ORTHOPAEDIC SURGERY

## 2023-01-18 PROCEDURE — 65270000029 HC RM PRIVATE

## 2023-01-18 PROCEDURE — 77030040361 HC SLV COMPR DVT MDII -B

## 2023-01-18 PROCEDURE — 87102 FUNGUS ISOLATION CULTURE: CPT

## 2023-01-18 PROCEDURE — 87075 CULTR BACTERIA EXCEPT BLOOD: CPT

## 2023-01-18 PROCEDURE — 76210000016 HC OR PH I REC 1 TO 1.5 HR: Performed by: ORTHOPAEDIC SURGERY

## 2023-01-18 PROCEDURE — 0SPD0JZ REMOVAL OF SYNTHETIC SUBSTITUTE FROM LEFT KNEE JOINT, OPEN APPROACH: ICD-10-PCS | Performed by: ORTHOPAEDIC SURGERY

## 2023-01-18 PROCEDURE — 74011250636 HC RX REV CODE- 250/636: Performed by: PHYSICIAN ASSISTANT

## 2023-01-18 PROCEDURE — 77030005513 HC CATH URETH FOL11 MDII -B: Performed by: ORTHOPAEDIC SURGERY

## 2023-01-18 PROCEDURE — 77030020274 HC MISC IMPL ORTHOPEDIC: Performed by: ORTHOPAEDIC SURGERY

## 2023-01-18 PROCEDURE — 0SRD0MA REPLACEMENT OF LEFT KNEE JOINT WITH LATERAL UNICONDYLAR SYNTHETIC SUBSTITUTE, UNCEMENTED, OPEN APPROACH: ICD-10-PCS | Performed by: ORTHOPAEDIC SURGERY

## 2023-01-18 PROCEDURE — 77030033067 HC SUT PDO STRATFX SPIR J&J -B: Performed by: ORTHOPAEDIC SURGERY

## 2023-01-18 DEVICE — COMPONENT FEM CEM 7+ LT KNEE REV COCR PERSONA: Type: IMPLANTABLE DEVICE | Site: KNEE | Status: FUNCTIONAL

## 2023-01-18 DEVICE — IMPLANTABLE DEVICE
Type: IMPLANTABLE DEVICE | Site: KNEE | Status: FUNCTIONAL
Brand: PERSONA®

## 2023-01-18 DEVICE — IMPLANTABLE DEVICE: Type: IMPLANTABLE DEVICE | Site: KNEE | Status: FUNCTIONAL

## 2023-01-18 DEVICE — CEMENT BNE MED VISC 80 GM GENTAMICIN PALACOS MV+G PRO: Type: IMPLANTABLE DEVICE | Site: KNEE | Status: FUNCTIONAL

## 2023-01-18 DEVICE — IMPLANTABLE DEVICE
Type: IMPLANTABLE DEVICE | Site: KNEE | Status: FUNCTIONAL
Brand: PERSONA® TRABECULAR METAL®

## 2023-01-18 RX ORDER — ROPIVACAINE HYDROCHLORIDE 5 MG/ML
INJECTION, SOLUTION EPIDURAL; INFILTRATION; PERINEURAL
Status: COMPLETED | OUTPATIENT
Start: 2023-01-18 | End: 2023-01-18

## 2023-01-18 RX ORDER — LIDOCAINE HYDROCHLORIDE 10 MG/ML
0.1 INJECTION, SOLUTION EPIDURAL; INFILTRATION; INTRACAUDAL; PERINEURAL AS NEEDED
Status: DISCONTINUED | OUTPATIENT
Start: 2023-01-18 | End: 2023-01-18 | Stop reason: HOSPADM

## 2023-01-18 RX ORDER — SODIUM CHLORIDE 0.9 % (FLUSH) 0.9 %
5-40 SYRINGE (ML) INJECTION AS NEEDED
Status: DISCONTINUED | OUTPATIENT
Start: 2023-01-18 | End: 2023-01-20 | Stop reason: HOSPADM

## 2023-01-18 RX ORDER — GLYCOPYRROLATE 0.2 MG/ML
0.2 INJECTION INTRAMUSCULAR; INTRAVENOUS
Status: COMPLETED | OUTPATIENT
Start: 2023-01-18 | End: 2023-01-18

## 2023-01-18 RX ORDER — SODIUM CHLORIDE 9 MG/ML
25 INJECTION, SOLUTION INTRAVENOUS CONTINUOUS
Status: DISCONTINUED | OUTPATIENT
Start: 2023-01-18 | End: 2023-01-18 | Stop reason: HOSPADM

## 2023-01-18 RX ORDER — ALBUTEROL SULFATE 0.83 MG/ML
2.5 SOLUTION RESPIRATORY (INHALATION) AS NEEDED
Status: DISCONTINUED | OUTPATIENT
Start: 2023-01-18 | End: 2023-01-18 | Stop reason: HOSPADM

## 2023-01-18 RX ORDER — SODIUM CHLORIDE 0.9 % (FLUSH) 0.9 %
5-40 SYRINGE (ML) INJECTION EVERY 8 HOURS
Status: DISCONTINUED | OUTPATIENT
Start: 2023-01-18 | End: 2023-01-20 | Stop reason: HOSPADM

## 2023-01-18 RX ORDER — BUPIVACAINE HYDROCHLORIDE 5 MG/ML
INJECTION, SOLUTION EPIDURAL; INTRACAUDAL
Status: COMPLETED | OUTPATIENT
Start: 2023-01-18 | End: 2023-01-18

## 2023-01-18 RX ORDER — ONDANSETRON 2 MG/ML
4 INJECTION INTRAMUSCULAR; INTRAVENOUS AS NEEDED
Status: DISCONTINUED | OUTPATIENT
Start: 2023-01-18 | End: 2023-01-18 | Stop reason: HOSPADM

## 2023-01-18 RX ORDER — FAMOTIDINE 20 MG/1
40 TABLET, FILM COATED ORAL EVERY EVENING
Status: DISCONTINUED | OUTPATIENT
Start: 2023-01-18 | End: 2023-01-20

## 2023-01-18 RX ORDER — FENTANYL CITRATE 50 UG/ML
25 INJECTION, SOLUTION INTRAMUSCULAR; INTRAVENOUS
Status: DISCONTINUED | OUTPATIENT
Start: 2023-01-18 | End: 2023-01-18 | Stop reason: HOSPADM

## 2023-01-18 RX ORDER — NALOXONE HYDROCHLORIDE 0.4 MG/ML
0.4 INJECTION, SOLUTION INTRAMUSCULAR; INTRAVENOUS; SUBCUTANEOUS AS NEEDED
Status: DISCONTINUED | OUTPATIENT
Start: 2023-01-18 | End: 2023-01-20 | Stop reason: HOSPADM

## 2023-01-18 RX ORDER — AMOXICILLIN 250 MG
1 CAPSULE ORAL 2 TIMES DAILY
Status: DISCONTINUED | OUTPATIENT
Start: 2023-01-18 | End: 2023-01-20 | Stop reason: HOSPADM

## 2023-01-18 RX ORDER — METOPROLOL SUCCINATE 50 MG/1
50 TABLET, EXTENDED RELEASE ORAL DAILY
Status: DISCONTINUED | OUTPATIENT
Start: 2023-01-19 | End: 2023-01-20 | Stop reason: HOSPADM

## 2023-01-18 RX ORDER — MIDAZOLAM HYDROCHLORIDE 1 MG/ML
0.5 INJECTION, SOLUTION INTRAMUSCULAR; INTRAVENOUS
Status: DISCONTINUED | OUTPATIENT
Start: 2023-01-18 | End: 2023-01-18 | Stop reason: HOSPADM

## 2023-01-18 RX ORDER — ACETAMINOPHEN 500 MG
500 TABLET ORAL
Status: DISCONTINUED | OUTPATIENT
Start: 2023-01-18 | End: 2023-01-20 | Stop reason: HOSPADM

## 2023-01-18 RX ORDER — ISOSORBIDE MONONITRATE 30 MG/1
30 TABLET, EXTENDED RELEASE ORAL
Status: DISCONTINUED | OUTPATIENT
Start: 2023-01-19 | End: 2023-01-20 | Stop reason: HOSPADM

## 2023-01-18 RX ORDER — PREGABALIN 75 MG/1
75 CAPSULE ORAL ONCE
Status: COMPLETED | OUTPATIENT
Start: 2023-01-18 | End: 2023-01-18

## 2023-01-18 RX ORDER — FACIAL-BODY WIPES
10 EACH TOPICAL DAILY PRN
Status: DISCONTINUED | OUTPATIENT
Start: 2023-01-20 | End: 2023-01-20 | Stop reason: HOSPADM

## 2023-01-18 RX ORDER — PRAVASTATIN SODIUM 40 MG/1
40 TABLET ORAL
Status: DISCONTINUED | OUTPATIENT
Start: 2023-01-18 | End: 2023-01-20 | Stop reason: HOSPADM

## 2023-01-18 RX ORDER — LANOLIN ALCOHOL/MO/W.PET/CERES
5 CREAM (GRAM) TOPICAL
Status: DISCONTINUED | OUTPATIENT
Start: 2023-01-18 | End: 2023-01-20 | Stop reason: HOSPADM

## 2023-01-18 RX ORDER — MIDAZOLAM HYDROCHLORIDE 1 MG/ML
1 INJECTION, SOLUTION INTRAMUSCULAR; INTRAVENOUS AS NEEDED
Status: DISCONTINUED | OUTPATIENT
Start: 2023-01-18 | End: 2023-01-18 | Stop reason: HOSPADM

## 2023-01-18 RX ORDER — TRANEXAMIC ACID 100 MG/ML
INJECTION, SOLUTION INTRAVENOUS AS NEEDED
Status: DISCONTINUED | OUTPATIENT
Start: 2023-01-18 | End: 2023-01-18 | Stop reason: HOSPADM

## 2023-01-18 RX ORDER — ACETAMINOPHEN 325 MG/1
650 TABLET ORAL ONCE
Status: DISCONTINUED | OUTPATIENT
Start: 2023-01-18 | End: 2023-01-18 | Stop reason: HOSPADM

## 2023-01-18 RX ORDER — SODIUM CHLORIDE 9 MG/ML
125 INJECTION, SOLUTION INTRAVENOUS CONTINUOUS
Status: DISPENSED | OUTPATIENT
Start: 2023-01-18 | End: 2023-01-19

## 2023-01-18 RX ORDER — LIDOCAINE HYDROCHLORIDE 20 MG/ML
INJECTION, SOLUTION EPIDURAL; INFILTRATION; INTRACAUDAL; PERINEURAL AS NEEDED
Status: DISCONTINUED | OUTPATIENT
Start: 2023-01-18 | End: 2023-01-18 | Stop reason: HOSPADM

## 2023-01-18 RX ORDER — POLYETHYLENE GLYCOL 3350 17 G/17G
17 POWDER, FOR SOLUTION ORAL DAILY
Status: DISCONTINUED | OUTPATIENT
Start: 2023-01-19 | End: 2023-01-20 | Stop reason: HOSPADM

## 2023-01-18 RX ORDER — FENTANYL CITRATE 50 UG/ML
50 INJECTION, SOLUTION INTRAMUSCULAR; INTRAVENOUS AS NEEDED
Status: DISCONTINUED | OUTPATIENT
Start: 2023-01-18 | End: 2023-01-18 | Stop reason: HOSPADM

## 2023-01-18 RX ORDER — PROPOFOL 10 MG/ML
INJECTION, EMULSION INTRAVENOUS
Status: DISCONTINUED | OUTPATIENT
Start: 2023-01-18 | End: 2023-01-18 | Stop reason: HOSPADM

## 2023-01-18 RX ORDER — SODIUM CHLORIDE, SODIUM LACTATE, POTASSIUM CHLORIDE, CALCIUM CHLORIDE 600; 310; 30; 20 MG/100ML; MG/100ML; MG/100ML; MG/100ML
1000 INJECTION, SOLUTION INTRAVENOUS CONTINUOUS
Status: DISCONTINUED | OUTPATIENT
Start: 2023-01-18 | End: 2023-01-18 | Stop reason: HOSPADM

## 2023-01-18 RX ORDER — HYDROMORPHONE HYDROCHLORIDE 1 MG/ML
0.2 INJECTION, SOLUTION INTRAMUSCULAR; INTRAVENOUS; SUBCUTANEOUS
Status: DISCONTINUED | OUTPATIENT
Start: 2023-01-18 | End: 2023-01-18 | Stop reason: HOSPADM

## 2023-01-18 RX ORDER — PROPOFOL 10 MG/ML
INJECTION, EMULSION INTRAVENOUS AS NEEDED
Status: DISCONTINUED | OUTPATIENT
Start: 2023-01-18 | End: 2023-01-18 | Stop reason: HOSPADM

## 2023-01-18 RX ORDER — OXYCODONE HYDROCHLORIDE 5 MG/1
2.5 TABLET ORAL
Status: DISCONTINUED | OUTPATIENT
Start: 2023-01-18 | End: 2023-01-20 | Stop reason: HOSPADM

## 2023-01-18 RX ORDER — OXYCODONE HYDROCHLORIDE 5 MG/1
5 TABLET ORAL
Status: DISCONTINUED | OUTPATIENT
Start: 2023-01-18 | End: 2023-01-20 | Stop reason: HOSPADM

## 2023-01-18 RX ORDER — HYDROCODONE BITARTRATE AND ACETAMINOPHEN 5; 325 MG/1; MG/1
1 TABLET ORAL AS NEEDED
Status: DISCONTINUED | OUTPATIENT
Start: 2023-01-18 | End: 2023-01-18 | Stop reason: HOSPADM

## 2023-01-18 RX ORDER — DIPHENHYDRAMINE HYDROCHLORIDE 50 MG/ML
12.5 INJECTION, SOLUTION INTRAMUSCULAR; INTRAVENOUS AS NEEDED
Status: DISCONTINUED | OUTPATIENT
Start: 2023-01-18 | End: 2023-01-18 | Stop reason: HOSPADM

## 2023-01-18 RX ORDER — HYDROXYZINE HYDROCHLORIDE 10 MG/1
10 TABLET, FILM COATED ORAL
Status: DISCONTINUED | OUTPATIENT
Start: 2023-01-18 | End: 2023-01-20 | Stop reason: HOSPADM

## 2023-01-18 RX ORDER — ROPIVACAINE HYDROCHLORIDE 5 MG/ML
30 INJECTION, SOLUTION EPIDURAL; INFILTRATION; PERINEURAL AS NEEDED
Status: DISCONTINUED | OUTPATIENT
Start: 2023-01-18 | End: 2023-01-18 | Stop reason: HOSPADM

## 2023-01-18 RX ORDER — ONDANSETRON 2 MG/ML
4 INJECTION INTRAMUSCULAR; INTRAVENOUS
Status: ACTIVE | OUTPATIENT
Start: 2023-01-18 | End: 2023-01-19

## 2023-01-18 RX ORDER — HYDROMORPHONE HYDROCHLORIDE 1 MG/ML
0.5 INJECTION, SOLUTION INTRAMUSCULAR; INTRAVENOUS; SUBCUTANEOUS
Status: ACTIVE | OUTPATIENT
Start: 2023-01-18 | End: 2023-01-19

## 2023-01-18 RX ORDER — ACETAMINOPHEN 500 MG
1000 TABLET ORAL ONCE
Status: COMPLETED | OUTPATIENT
Start: 2023-01-18 | End: 2023-01-18

## 2023-01-18 RX ADMIN — PROPOFOL 20 MG: 10 INJECTION, EMULSION INTRAVENOUS at 13:17

## 2023-01-18 RX ADMIN — MIDAZOLAM 2 MG: 1 INJECTION INTRAMUSCULAR; INTRAVENOUS at 12:20

## 2023-01-18 RX ADMIN — ACETAMINOPHEN 1000 MG: 500 TABLET, FILM COATED ORAL at 11:51

## 2023-01-18 RX ADMIN — PREGABALIN 75 MG: 75 CAPSULE ORAL at 11:52

## 2023-01-18 RX ADMIN — SENNOSIDES AND DOCUSATE SODIUM 1 TABLET: 50; 8.6 TABLET ORAL at 22:06

## 2023-01-18 RX ADMIN — FAMOTIDINE 40 MG: 20 TABLET, FILM COATED ORAL at 22:06

## 2023-01-18 RX ADMIN — Medication 4.5 MG: at 22:06

## 2023-01-18 RX ADMIN — SODIUM CHLORIDE, POTASSIUM CHLORIDE, SODIUM LACTATE AND CALCIUM CHLORIDE: 600; 310; 30; 20 INJECTION, SOLUTION INTRAVENOUS at 13:56

## 2023-01-18 RX ADMIN — PROPOFOL 30 MG: 10 INJECTION, EMULSION INTRAVENOUS at 13:16

## 2023-01-18 RX ADMIN — WATER 2 G: 1 INJECTION INTRAMUSCULAR; INTRAVENOUS; SUBCUTANEOUS at 22:07

## 2023-01-18 RX ADMIN — SODIUM CHLORIDE, POTASSIUM CHLORIDE, SODIUM LACTATE AND CALCIUM CHLORIDE 1000 ML: 600; 310; 30; 20 INJECTION, SOLUTION INTRAVENOUS at 11:54

## 2023-01-18 RX ADMIN — ROPIVACAINE HYDROCHLORIDE 30 ML: 5 INJECTION, SOLUTION EPIDURAL; INFILTRATION; PERINEURAL at 12:30

## 2023-01-18 RX ADMIN — TRANEXAMIC ACID 1 G: 100 INJECTION, SOLUTION INTRAVENOUS at 13:46

## 2023-01-18 RX ADMIN — LIDOCAINE HYDROCHLORIDE 40 MG: 20 INJECTION, SOLUTION EPIDURAL; INFILTRATION; INTRACAUDAL; PERINEURAL at 13:16

## 2023-01-18 RX ADMIN — GLYCOPYRROLATE 0.2 MG: 0.2 INJECTION INTRAMUSCULAR; INTRAVENOUS at 14:13

## 2023-01-18 RX ADMIN — BUPIVACAINE HYDROCHLORIDE 10 MG: 5 INJECTION, SOLUTION EPIDURAL; INTRACAUDAL; PERINEURAL at 13:24

## 2023-01-18 RX ADMIN — FENTANYL CITRATE 50 MCG: 50 INJECTION, SOLUTION INTRAMUSCULAR; INTRAVENOUS at 12:20

## 2023-01-18 RX ADMIN — OXYCODONE HYDROCHLORIDE 5 MG: 5 TABLET ORAL at 22:06

## 2023-01-18 RX ADMIN — PHENYLEPHRINE HYDROCHLORIDE 40 MCG/MIN: 10 INJECTION INTRAVENOUS at 13:27

## 2023-01-18 RX ADMIN — PROPOFOL 75 MCG/KG/MIN: 10 INJECTION, EMULSION INTRAVENOUS at 13:16

## 2023-01-18 RX ADMIN — PRAVASTATIN SODIUM 40 MG: 40 TABLET ORAL at 22:06

## 2023-01-18 RX ADMIN — SODIUM CHLORIDE: 900 INJECTION, SOLUTION INTRAVENOUS at 13:55

## 2023-01-18 RX ADMIN — WATER 2 G: 1 INJECTION INTRAMUSCULAR; INTRAVENOUS; SUBCUTANEOUS at 13:43

## 2023-01-18 RX ADMIN — ACETAMINOPHEN 500 MG: 500 TABLET, FILM COATED ORAL at 22:06

## 2023-01-18 NOTE — DISCHARGE INSTRUCTIONS
Post-op Discharge Instructions Following Total Joint Replacement  Yo Artis MD  [address]  [phone]  Follow-up Office Visit  See Dr. Jose Leach approximately 3-4 weeks from date of surgery. Call [phone] to make an appointment. Call Chet Reina LPN if you have questions or concerns, [phone]. Activity  Use your walker for ambulation. Weight bearing as tolerated unless instructed otherwise by the physical therapist. Get up every hour you are awake and take a brief walk. Lengthen walking distance daily as your strength improves. Continue using your walker until seen in the office for your first follow up visit. Practice your exercises 3 times daily as instructed by the physical therapist. Jyoti Beards for 20 minutes after exercising. No driving until seen in the office for your first follow up visit. Incision Care  The light brown Aquacel surgical dressing is waterproof and is to remain on your incision for 7 days. On the 7th day, carefully lift the edge of the dressing to break the adhesive seal and gently peel it off. If your Aquacel dressings comes loose or falls off before the 7th day, replace it with a dry sterile gauze dressing and change this dressing daily. Once there is no drainage on the bandage, you mean leave the incision open to air. You may take a shower with the Aquacel dressing in place. After you remove the Aquacel dressing on day 7, you may continue to shower and get your incision wet in the shower. Do not submerge your incision under water in a bathtub, hot tub, swimming pool, etc. until after you have been evaluated at your first office visit. Medications  Blood Clot Prevention: Take medication as prescribed by your physician for 4 weeks postop. Please take Eliquis 2.5 mg by mouth twice a day for 5 days post operatively (1/19/23 through 1/23/22).   On 1/24/23, please resume Eliquis 5 mg PO BID and continue as directed by your cardiologist.    Pain Management: Take pain medication as prescribed; wean yourself off of pain medication as your pain lessens. Take with food. You make also take Tylenol every 4-6 hours as needed for pain. Do not exceed 3 grams (3000mg) per day. Place an ice bag on or around the incision for 20 minutes on / 20 minutes off as needed throughout the day and night, especially after exercising. Stool Softener: You may want to take a stool softener (such as Senokot-S or Colace) to prevent constipation while taking pain medication. If constipation occurs, you may also use a laxative (such as Dulcolax tablets, Miralax, or a suppository). Diet  Resume usual diet at home. Drink plenty of fluids. Eat foods high in fiber and protein. Calcium and Vitamin D supplements recommended. Avoid alcoholic beverages. No smoking. When to call your Orthopaedic Surgeon: If you call after 5pm or on a weekend, the on call physician will return your call  Pain that is not relieved by pain medication, ice, and activity modification  Signs of infection (red incision, continuous drainage from the incision, malodorous drainage, persistent fever greater than 101 degrees Fahrenheit)  Signs of a blood clot in your leg (calf pain, tenderness, redness, and/or swelling of the lower leg)  ?   When to call your Primary Care Physician  Concerns about your medical conditions such as diabetes, high blood pressure, asthma, congestive heart failure  Call if blood sugars are elevated, if you have a persistent headache or dizziness, coughing or congestion, constipation or diarrhea, burning with urination, abnormal heart rate (fast or slow)  When to call 911 and go to the nearest Emergency Room  Acute onset of chest pain, shortness of breath, difficulty breathing

## 2023-01-18 NOTE — ANESTHESIA PROCEDURE NOTES
Peripheral Block    Start time: 1/18/2023 12:22 PM  End time: 1/18/2023 12:30 PM  Performed by: María Cabezas MD  Authorized by: María Cabezas MD       Pre-procedure: Indications: at surgeon's request and post-op pain management    Preanesthetic Checklist: patient identified, risks and benefits discussed, site marked, timeout performed, anesthesia consent given and patient being monitored    Timeout Time: 12:22 EST      Block Type:   Block Type:   Adductor canal and adductor canal block  Laterality:  Left  Monitoring:  Standard ASA monitoring, continuous pulse ox, frequent vital sign checks, heart rate, oxygen and responsive to questions  Injection Technique:  Single shot  Procedures: ultrasound guided    Patient Position: supine  Prep: chlorhexidine    Location:  Mid thigh  Needle Type:  Stimuplex  Needle Gauge:  22 G  Needle Localization:  Ultrasound guidance  Medication Injected:  Ropivacaine (PF) (NAROPIN)(0.5%) 5 mg/mL injection - Peripheral Nerve Block   30 mL - 1/18/2023 12:30:00 PM  Med Admin Time: 1/18/2023 12:30 AM    Assessment:  Number of attempts:  1  Injection Assessment:  Incremental injection every 5 mL, negative aspiration for CSF, no paresthesia, no intravascular symptoms, negative aspiration for blood and local visualized surrounding nerve on ultrasound  Patient tolerance:  Patient tolerated the procedure well with no immediate complications

## 2023-01-18 NOTE — PROGRESS NOTES
01/18/23 1429   Family Communication   Family Update Message Procedure started   Delivery Origin Nurse    Relationship to Patient Daughter    Phone Number Jalil Massena [phone]   Family/Significant Other Update Called

## 2023-01-18 NOTE — PERIOP NOTES
Pt was here a month ago for surgery but was on coumadin and her case was postponed due to bloodwork. Per order, type and screen if hgb less than 10. Notified Dr Olya Patiño of pt hgb on 12/8 which was 13.5.   Per Dr Olya Patiño no type and screen needed

## 2023-01-18 NOTE — PERIOP NOTES
TRANSFER - OUT REPORT:    Verbal report given to [place] (name) on Ammy Hankins  being transferred to [facility] (unit) for routine post - op       Report consisted of patients Situation, Background, Assessment and   Recommendations(SBAR). Time Pre op antibiotic given:1343  Anesthesia Stop time: 1656    Information from the following report(s) SBAR, OR Summary, Procedure Summary, Intake/Output, MAR, and Cardiac Rhythm SR  was reviewed with the receiving nurse. Opportunity for questions and clarification was provided. Is the patient on 02? NO       L/Min RA       Other N/A    Is the patient on a monitor? NO    Is the nurse transporting with the patient? YES    Surgical Waiting Area notified of patient's transfer from PACU? YES      The following personal items collected during your admission accompanied patient upon transfer:   Dental Appliance: Dental Appliances: Partials (pt wanted to put it in bag with clothes) w/ pt to 560  Vision:    Hearing Aid:    Jewelry: Jewelry: None  Clothing: Clothing:  (clothes and shoes to PACU)  560  Other Valuables:  Other Valuables: None  Valuables sent to safe:

## 2023-01-18 NOTE — ANESTHESIA PREPROCEDURE EVALUATION
Relevant Problems   No relevant active problems       Anesthetic History   No history of anesthetic complications            Review of Systems / Medical History  Patient summary reviewed, nursing notes reviewed and pertinent labs reviewed    Pulmonary  Within defined limits                 Neuro/Psych   Within defined limits           Cardiovascular  Within defined limits  Hypertension        Dysrhythmias            GI/Hepatic/Renal  Within defined limits   GERD           Endo/Other  Within defined limits      Arthritis     Other Findings              Physical Exam    Airway  Mallampati: II  TM Distance: > 6 cm  Neck ROM: normal range of motion   Mouth opening: Normal     Cardiovascular  Regular rate and rhythm,  S1 and S2 normal,  no murmur, click, rub, or gallop             Dental  No notable dental hx       Pulmonary  Breath sounds clear to auscultation               Abdominal  GI exam deferred       Other Findings            Anesthetic Plan    ASA: 3  Anesthesia type: spinal      Post-op pain plan if not by surgeon: peripheral nerve block single    Induction: Intravenous  Anesthetic plan and risks discussed with: Patient

## 2023-01-18 NOTE — H&P
SUBJECTIVE/OBJECTIVE:  Waleska Beltran presents today for Revision left total knee replacement. I performed staged bilateral total knees almost 7 years ago. Right continues to do very well. She has had progressive activity related pain on the left side. When I saw her a few years ago she had some soreness but no significant pain. She is now having more significant start up pain and gelling symptoms. She does not trust the knee. She is only able to be on her feet for short periods of time. At times pain is bad enough to use a walker. She has no rest pain or night pain. She has significant difficulty with simple ADLs. She underwent cardiac evaluation in preparation for his procedure.      Past Surgical History:   Procedure Laterality Date    HX APPENDECTOMY  1953    HX BREAST BIOPSY  2008    HX BREAST REDUCTION  2006    HX CATARACT REMOVAL Bilateral     HX CERVICAL FUSION  1981    HX COLONOSCOPY      HX CYST INCISION AND DRAINAGE Left 11/15/2018    excision of sebaceous cyst left shoulder     HX DILATION AND CURETTAGE  1973    HX GI      appendectomy    HX GI  1975    hemorrhoidectomy    HX HEENT      neck surgery cervical discectomy    HX HERNIA REPAIR  8719    UMBILICAL    HX HYSTERECTOMY  1976    VAGINAL    HX KNEE REPLACEMENT Left 08/11/2016    HX MOHS PROCEDURES  2005    right shoulder    HX ORTHOPAEDIC  1991    left leg/ankle fx repair    HX ORTHOPAEDIC  2004    right BUNIONECTOMY    HX ORTHOPAEDIC  2009    RIGHT HAMMERTOE    HX ORTHOPAEDIC  2015    R TOTAL KNEE    HX ORTHOPAEDIC  2012    back fusion    HX OTHER SURGICAL  05/15/2013    surgery on neck glands/Parathyroid removed    HX OTHER SURGICAL  04/14/2014    cyst removed from back    HX PARATHYROIDECTOMY  2013    HX UROLOGICAL  2012    lithotrypsy , 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      Past Medical History:   Diagnosis Date    Adverse effect of anesthesia     \"lost\" 4 days on Morphine-BACK SURGERY    Arrhythmia 12/28/2011    DR German Class DILTIAZEM DT NO ISSUES 2014, PT STATES SHE WENT INTO AFIB DURING RIGHT KNEE SURGERY IN 2014    Arthritis     Chronic pain     BOTH KNEES    GERD (gastroesophageal reflux disease)     CONTROLLED WITH OMEPRAZOLE    Hypertension     Ill-defined condition     constipation    Sebaceous cyst 04/14/2014    Snores      Allergies   Allergen Reactions    Codeine Nausea and Vomiting    Demerol [Meperidine] Palpitations and Other (comments)     \"made me hyper\"    Morphine Other (comments)     \"disoriented\"    Penicillin G Hives and Swelling     EYE SWELLING  Patient screened for any delayed non-IgE-mediated reaction to PCN. Patient notes the following:    No delayed non-IgE-mediated reaction to PCN              No current facility-administered medications on file prior to encounter. Current Outpatient Medications on File Prior to Encounter   Medication Sig Dispense Refill    isosorbide mononitrate ER (IMDUR) 30 mg tablet Take 30 mg by mouth every morning. polyethylene glycol (MIRALAX) 17 gram/dose powder Take 17 g by mouth daily. cholecalciferol, vitamin D3, (VITAMIN D3 PO) Take 1 Tablet by mouth daily. multivitamin (ONE A DAY) tablet Take 1 Tablet by mouth every evening. melatonin 5 mg tablet Take 5 mg by mouth nightly. famotidine (PEPCID) 40 mg tablet Take 40 mg by mouth every evening. metoprolol tartrate (LOPRESSOR) 25 mg tablet Take 50 mg by mouth daily. pravastatin (PRAVACHOL) 40 mg tablet Take 40 mg by mouth nightly. furosemide (LASIX) 40 mg tablet Take 40 mg by mouth daily. Indications: SWELLING IN ANKLES; LEFT SWELLS MORE FOLLOWING FRACTURES      warfarin (COUMADIN) 5 mg tablet Take 2.5 mg by mouth every evening.  (Patient not taking: Reported on 1/18/2023)       Social History     Socioeconomic History    Marital status:      Spouse name: Not on file    Number of children: Not on file    Years of education: Not on file    Highest education level: Not on file Occupational History    Not on file   Tobacco Use    Smoking status: Never    Smokeless tobacco: Never   Vaping Use    Vaping Use: Never used   Substance and Sexual Activity    Alcohol use: No    Drug use: No    Sexual activity: Not on file   Other Topics Concern    Not on file   Social History Narrative    Not on file     Social Determinants of Health     Financial Resource Strain: Not on file   Food Insecurity: Not on file   Transportation Needs: Not on file   Physical Activity: Not on file   Stress: Not on file   Social Connections: Not on file   Intimate Partner Violence: Not on file   Housing Stability: Not on file     Family History   Problem Relation Age of Onset    Other Mother         BROKE BACK    Emphysema Father     Diabetes Sister     OSTEOARTHRITIS Sister     OSTEOARTHRITIS Sister     Dementia Sister     Cancer Daughter         LUNG CANCER WITH METS TO SPINE    No Known Problems Son     Anesth Problems Neg Hx        ROS:  Patient denies any recent fever, chills, nausea, vomiting, chest pain, or shortness of breath. PHYSICAL EXAM:  Vitals:   Visit Vitals  /63 (BP 1 Location: Left upper arm, BP Patient Position: At rest)   Pulse 63   Temp 97.5 °F (36.4 °C)   Resp 14   SpO2 98%          82y.o. year old F, no distress. chest clear. Heart RRR. Ambulates with mild limp on the left side. Pain-free motion left hip. Negative Stinchfield. Left knee neutral alignment. Healed midline anterior scar. Trace effusion left knee. Tender across the medial joint line. Full active knee extension with flexion to approximately 115 degrees. Patella tracks centrally. No gross instability. Symmetrical palpable distal pulses. No gross motor or sensory deficits in lower extremities. No distal edema.         IMAGING:  Radiographs: XR Results (most recent):  Results from Appointment encounter on 10/28/22     XR KNEE LT 3 V     Narrative  3 x-ray views of left knee including AP, lateral, sunrise demonstrate entry position and alignment of cemented posterior stabilized attune total knee femoral component. No evidence of loosening. Tibial component appears loose with complete lucency around the tibial tray, and the tibial tray has migrated into varus compared to previous x-rays 2 years ago. ASSESSMENT/PLAN:  1. Mechanical loosening of internal left knee prosthetic joint, initial encounter ([facility] [address]  2. Status post total left knee replacement     Failed left total knee replacement due to loosening of tibial component. Proceed with revision left total knee replacement. Discussed risks and benefits in detail as well as anticipated hospital stay and course of rehabilitation. Topical tranexamic acid intraoperatively; half dose Eliquis for 5 days postop before resuming her usual dose.         Luis Bower MD

## 2023-01-18 NOTE — ANESTHESIA PROCEDURE NOTES
Spinal Block    Start time: 1/18/2023 1:16 PM  End time: 1/18/2023 1:24 PM  Performed by: Franky Pate CRNA  Authorized by: Dari Jackson MD     Pre-procedure:   Indications: primary anesthetic  Preanesthetic Checklist: patient identified, risks and benefits discussed, anesthesia consent, site marked, patient being monitored, timeout performed and fire risk safety assessment completed and verbalized    Timeout Time: 13:15 EST      Spinal Block:   Patient Position:  Seated  Prep Region:  Lumbar      Location:  L2-3  Technique:  Single shot  Local: bupivacaine (PF) (MARCAINE) 0.5 % (5 mg/mL) intrathecal - Intrathecal   10 mg - 1/18/2023 1:24:00 PM    Med Admin Time: 1/18/2023 1:24 PM    Needle:   Needle Type:  Pencan  Needle Gauge:  24 G  Attempts:  1      Events: CSF confirmed, no blood with aspiration, no paresthesia and injection painful        Assessment:  Insertion:  Uncomplicated  Patient tolerance:  Patient tolerated the procedure well with no immediate complications

## 2023-01-19 LAB
ANION GAP SERPL CALC-SCNC: 6 MMOL/L (ref 5–15)
BACTERIA SPEC CULT: NORMAL
BUN SERPL-MCNC: 17 MG/DL (ref 6–20)
BUN/CREAT SERPL: 15 (ref 12–20)
CALCIUM SERPL-MCNC: 8.9 MG/DL (ref 8.5–10.1)
CHLORIDE SERPL-SCNC: 109 MMOL/L (ref 97–108)
CO2 SERPL-SCNC: 24 MMOL/L (ref 21–32)
CREAT SERPL-MCNC: 1.1 MG/DL (ref 0.55–1.02)
GLUCOSE BLD STRIP.AUTO-MCNC: 100 MG/DL (ref 65–117)
GLUCOSE SERPL-MCNC: 115 MG/DL (ref 65–100)
GRAM STN SPEC: NORMAL
HGB BLD-MCNC: 11.2 G/DL (ref 11.5–16)
POTASSIUM SERPL-SCNC: 4 MMOL/L (ref 3.5–5.1)
SERVICE CMNT-IMP: NORMAL
SODIUM SERPL-SCNC: 139 MMOL/L (ref 136–145)

## 2023-01-19 PROCEDURE — 77030019905 HC CATH URETH INTMIT MDII -A

## 2023-01-19 PROCEDURE — 80048 BASIC METABOLIC PNL TOTAL CA: CPT

## 2023-01-19 PROCEDURE — 2709999900 HC NON-CHARGEABLE SUPPLY

## 2023-01-19 PROCEDURE — 36415 COLL VENOUS BLD VENIPUNCTURE: CPT

## 2023-01-19 PROCEDURE — 74011250636 HC RX REV CODE- 250/636: Performed by: PHYSICIAN ASSISTANT

## 2023-01-19 PROCEDURE — 97161 PT EVAL LOW COMPLEX 20 MIN: CPT | Performed by: PHYSICAL THERAPIST

## 2023-01-19 PROCEDURE — 85018 HEMOGLOBIN: CPT

## 2023-01-19 PROCEDURE — 97116 GAIT TRAINING THERAPY: CPT | Performed by: PHYSICAL THERAPIST

## 2023-01-19 PROCEDURE — 74011000250 HC RX REV CODE- 250: Performed by: PHYSICIAN ASSISTANT

## 2023-01-19 PROCEDURE — 82962 GLUCOSE BLOOD TEST: CPT

## 2023-01-19 PROCEDURE — 97530 THERAPEUTIC ACTIVITIES: CPT | Performed by: PHYSICAL THERAPIST

## 2023-01-19 PROCEDURE — 74011250637 HC RX REV CODE- 250/637: Performed by: PHYSICIAN ASSISTANT

## 2023-01-19 PROCEDURE — 51798 US URINE CAPACITY MEASURE: CPT

## 2023-01-19 PROCEDURE — 97165 OT EVAL LOW COMPLEX 30 MIN: CPT

## 2023-01-19 PROCEDURE — 77030028907 HC WRP KNEE WO BGS SOLM -B

## 2023-01-19 PROCEDURE — 65270000029 HC RM PRIVATE

## 2023-01-19 RX ORDER — AMOXICILLIN 250 MG
1 CAPSULE ORAL 2 TIMES DAILY
Qty: 60 TABLET | Refills: 0 | Status: SHIPPED | OUTPATIENT
Start: 2023-01-19 | End: 2023-02-18

## 2023-01-19 RX ORDER — OXYCODONE HYDROCHLORIDE 5 MG/1
2.5-5 TABLET ORAL
Qty: 42 TABLET | Refills: 0 | Status: SHIPPED | OUTPATIENT
Start: 2023-01-19 | End: 2023-01-26

## 2023-01-19 RX ORDER — METOPROLOL SUCCINATE 50 MG/1
50 TABLET, EXTENDED RELEASE ORAL DAILY
COMMUNITY

## 2023-01-19 RX ORDER — ACETAMINOPHEN 500 MG
500 TABLET ORAL
Qty: 100 TABLET | Refills: 0 | Status: SHIPPED | OUTPATIENT
Start: 2023-01-19

## 2023-01-19 RX ORDER — FUROSEMIDE 40 MG/1
40 TABLET ORAL DAILY
Status: DISCONTINUED | OUTPATIENT
Start: 2023-01-19 | End: 2023-01-20 | Stop reason: HOSPADM

## 2023-01-19 RX ADMIN — OXYCODONE HYDROCHLORIDE 5 MG: 5 TABLET ORAL at 18:22

## 2023-01-19 RX ADMIN — ISOSORBIDE MONONITRATE 30 MG: 30 TABLET, EXTENDED RELEASE ORAL at 06:23

## 2023-01-19 RX ADMIN — ACETAMINOPHEN 500 MG: 500 TABLET, FILM COATED ORAL at 18:22

## 2023-01-19 RX ADMIN — OXYCODONE HYDROCHLORIDE 5 MG: 5 TABLET ORAL at 13:42

## 2023-01-19 RX ADMIN — APIXABAN 2.5 MG: 2.5 TABLET, FILM COATED ORAL at 18:22

## 2023-01-19 RX ADMIN — PRAVASTATIN SODIUM 40 MG: 40 TABLET ORAL at 22:08

## 2023-01-19 RX ADMIN — SENNOSIDES AND DOCUSATE SODIUM 1 TABLET: 50; 8.6 TABLET ORAL at 10:14

## 2023-01-19 RX ADMIN — OXYCODONE HYDROCHLORIDE 5 MG: 5 TABLET ORAL at 22:08

## 2023-01-19 RX ADMIN — SODIUM CHLORIDE, PRESERVATIVE FREE 10 ML: 5 INJECTION INTRAVENOUS at 06:23

## 2023-01-19 RX ADMIN — Medication 4.5 MG: at 22:08

## 2023-01-19 RX ADMIN — POLYETHYLENE GLYCOL 3350 17 G: 17 POWDER, FOR SOLUTION ORAL at 10:14

## 2023-01-19 RX ADMIN — SODIUM CHLORIDE, PRESERVATIVE FREE 10 ML: 5 INJECTION INTRAVENOUS at 22:08

## 2023-01-19 RX ADMIN — APIXABAN 2.5 MG: 2.5 TABLET, FILM COATED ORAL at 10:14

## 2023-01-19 RX ADMIN — ACETAMINOPHEN 500 MG: 500 TABLET, FILM COATED ORAL at 10:14

## 2023-01-19 RX ADMIN — ACETAMINOPHEN 500 MG: 500 TABLET, FILM COATED ORAL at 22:08

## 2023-01-19 RX ADMIN — ACETAMINOPHEN 500 MG: 500 TABLET, FILM COATED ORAL at 13:42

## 2023-01-19 RX ADMIN — FAMOTIDINE 40 MG: 20 TABLET, FILM COATED ORAL at 18:22

## 2023-01-19 RX ADMIN — ACETAMINOPHEN 500 MG: 500 TABLET, FILM COATED ORAL at 06:23

## 2023-01-19 RX ADMIN — SENNOSIDES AND DOCUSATE SODIUM 1 TABLET: 50; 8.6 TABLET ORAL at 18:22

## 2023-01-19 RX ADMIN — WATER 2 G: 1 INJECTION INTRAMUSCULAR; INTRAVENOUS; SUBCUTANEOUS at 06:23

## 2023-01-19 RX ADMIN — METOPROLOL SUCCINATE 50 MG: 50 TABLET, EXTENDED RELEASE ORAL at 10:14

## 2023-01-19 NOTE — PROGRESS NOTES
CELIO: anticipate d/c to IPR vs SNF; Referrals pending in [facility] to [facility] [address] faxed referral to [facility] at [phone]  Contact is: Rebekah [phone]    Follow up with PCP & Specialist as directed    Pt may need a Covid test pending rehab facility policy    W/c transport    RUR: 8%  PT/OT recs IPR  Reason for Admission:    S/p revision of left total knee arthoplasty              History of: HTN, chronic pain     RUR Score:                   8%  Plan for utilizing home health:        Plan for rehab placement   PCP: First and Last name:  Dr. Gato Espinosa  In [facility], her former Doctor, Dr. Gilberto Baird retired. Name of Practice:    Are you a current patient: Yes/No: YES   Approximate date of last visit: within last six months   Can you participate in a virtual visit with your PCP: YES                    Current Advanced Directive/Advance Care Plan: Full Code      Healthcare Decision Maker:   Click here to complete [address] including selection of the Healthcare Decision Maker Relationship (ie \"Primary\")           Daughter, Lupis Mirza, [phone]                  Transition of Care Plan:                    1030-CM reviewed pt chart & met with pt at bedside to discuss transitional planning. Pt resides alone in a two story home with two WANDER and 1st floor bedroom/bathroom. Pt usually is independent with adls/iadls. DME is: MOHAMUD. Pt uses [facility] in [employer] for meds with no copay concerns. CM confirmed pcp and demographics. CM discussed IPR vs SNF and pt agreeable to referrals noted above. Pt advises she has history of IPR at [facility] in 2016 & is familiar with [facility] and would like referrals placed to both. CM discussed this plan with therapy team as well as Ortho NP and they are all in agreement with plan, with preference for IPR given pt's age and prior functional status. CM to follow.   1600-CM was informed by Miguel Healy of [facility] that she is going to submit for medical director review given that she has both pt & ot notes on pt. CM to follow. Dhaval Reyes RN BSN CCM  Transition of Care Plan:     The Plan for Transition of Care is related to the following treatment Plattebakeri Kohler     The Patient and/or patient representative was provided with a choice of provider and agrees  with the discharge plan. Yes [x] No []    A Freedom of choice list was provided with basic dialogue that supports the patient's individualized plan of care/goals and shares the quality data associated with the providers. Yes [x] No []   Care Management Interventions  PCP Verified by CM: Yes  Palliative Care Criteria Met (RRAT>21 & CHF Dx)?: No  Mode of Transport at Discharge:  Other (see comment) (w/c transport)  Transition of Care Consult (CM Consult): Discharge Planning  MyChart Signup: No  Discharge Durable Medical Equipment: No  Health Maintenance Reviewed: Yes  Physical Therapy Consult: Yes  Occupational Therapy Consult: Yes  Support Systems: Child(elba)  Confirm Follow Up Transport: Wheelchair Irvin Gutierrez  The Patient and/or Patient Representative was Provided with a Choice of Provider and Agrees with the Discharge Plan?: Yes  Name of the Patient Representative Who was Provided with a Choice of Provider and Agrees with the Discharge Plan: patient  Freedom of Choice List was Provided with Basic Dialogue that Supports the Patient's Individualized Plan of Care/Goals, Treatment Preferences and Shares the Quality Data Associated with the Providers?: Yes  Discharge Location  Patient Expects to be Discharged to[de-identified] Skilled nursing facility

## 2023-01-19 NOTE — PROGRESS NOTES
Problem: Falls - Risk of  Goal: *Absence of Falls  Description: Document Hanlauren Haines Fall Risk and appropriate interventions in the flowsheet.   Outcome: Progressing Towards Goal  Note: Fall Risk Interventions:                                Problem: Knee Replacement: Day of Surgery/Unit  Goal: Activity/Safety  Outcome: Progressing Towards Goal  Goal: Diagnostic Test/Procedures  Outcome: Progressing Towards Goal  Goal: Nutrition/Diet  Outcome: Progressing Towards Goal  Goal: Medications  Outcome: Progressing Towards Goal  Goal: Respiratory  Outcome: Progressing Towards Goal  Goal: Treatments/Interventions/Procedures  Outcome: Progressing Towards Goal  Goal: Psychosocial  Outcome: Progressing Towards Goal  Goal: *Initiate mobility  Outcome: Progressing Towards Goal  Goal: *Optimal pain control at patient's stated goal  Outcome: Progressing Towards Goal  Goal: *Hemodynamically stable  Outcome: Progressing Towards Goal

## 2023-01-19 NOTE — PROGRESS NOTES
Bedside and Verbal shift change report given to Romina Garcia, [facility] (oncoming nurse) by Inge Stratton RN (offgoing nurse). Report included the following information SBAR, Kardex, Procedure Summary, Intake/Output, MAR, Accordion, and Recent Results.

## 2023-01-19 NOTE — PROGRESS NOTES
Ortho NP Note    POD# 1  s/p REVISION OF LEFT TOTAL KNEE ARTHROPLASTY   Pt seen without visitor     Pt seated in chair. Reports pain at rest is 2/10. Has been using primarily tylenol per patient preference. Overnight up to [facility] multiple times, worked with PT this morning--slight but tolerable increase in pain with activity. Of note, patient lives alone at home. Daughter occasionally available but receiving cancer treatment and working as a full time teacher without additional time off. Patient therefore interested in possible placement for ongoing rehab prior to returning to private residence to live independently. Goal for placement in [facility] to be closer to family. Denies CP, SOB. No Dizziness, lightheadedness. No N/V,  ate breakfast this morning. Endorses difficulty emptying bladder overnight, small amounts of urine only. States this is not baseline for her. Since straight cath around 8 AM, has not yet felt urge to void. VSS Afebrile.     Visit Vitals  /64   Pulse 60   Temp 98.1 °F (36.7 °C)   Resp 16   SpO2 96%       Voiding status: pending spontaneous void   Output (mL)  Urine Voided: 50 ml (01/19/23 8235)  Straight Cath  Straight Cath: Nurse performed cath;Sterile technique used (01/19/23 0751)  Number of Attempts: 1 (01/19/23 0751)  Catheter Size: 14 FR (01/19/23 0751)  Time Catheter Inserted: 0740 (01/19/23 0751)  Time Catheter Removed: 6347 (01/19/23 0751)  Urine: 325 mL (01/19/23 0751)      Labs    Lab Results   Component Value Date/Time    HGB 11.2 (L) 01/19/2023 04:23 AM      Lab Results   Component Value Date/Time    INR 2.6 (H) 12/08/2022 02:21 PM    INR (POC) 2.0 (H) 12/21/2022 12:48 PM      Lab Results   Component Value Date/Time    Sodium 139 01/19/2023 04:23 AM    Potassium 4.0 01/19/2023 04:23 AM    Chloride 109 (H) 01/19/2023 04:23 AM    CO2 24 01/19/2023 04:23 AM    Glucose 115 (H) 01/19/2023 04:23 AM    BUN 17 01/19/2023 04:23 AM    Creatinine 1.10 (H) 01/19/2023 04:23 AM    Calcium 8.9 01/19/2023 04:23 AM     Recent Glucose Results:   Lab Results   Component Value Date/Time     (H) 01/19/2023 04:23 AM    GLUCPOC 100 01/19/2023 11:03 AM    GLUCPOC 102 01/18/2023 11:44 AM           There is no height or weight on file to calculate BMI. : A BMI > 30 is classified as obesity and > 40 is classified as morbid obesity. ACE wrap remains over Aquacel, to remove later today. Cryotherapy in place over incision  Calves soft and supple; No pain with passive stretch  Bilateral LEs warm, dry. 2+ DP pulses. Sensation and motor intact - PF/DF/EHL intact 5/5  Foot Pumps for mechanical DVT proph while in bed     PLAN:  1) PT: BID WBAT. Added OT evaluation for possible placement  2) Anticoagulation:  Eliquis 2.5 mg PO BID for DVT Prophylaxis x 5 days post op. Then resume home dose Eliquis 5 mg PO BID (will need rx at discharge, planning to continue on Eliquis as opposed to warfarin as previously decided by patient/cardiology). Encouraged early mobilization, bed exercises, and SCD use. 3) Pain - Multimodal approach including cryotherapy, scheduled Tylenol with  PRN  oxycodone, IV dilaudid   4) Post operative anemia: Hgb on 12/8: 13.5. EBL: 100 mL. Hgb on POD 1: 11.2. Hemodynamically stable, Expected Acute blood loss post-op anemia, continue to monitor. 5) POUR: Follow POUR algorithm, bladder scan. If greater than 400 mL, one additional straight cath then diaz placement if retention continues. 6) Post op care: Continue bowel regimen, encouraged IS. Follow up in 3-4 weeks with Dr Amy Agudelo. Aquacel to remain in place x 7 days unless integrity is lost.   7) Readiness for discharge: CM involved for placement, referrals to be sent today.       [x] Vital Signs stable    [] + Voiding    [x] Wound intact, drainage minimal    [x] Tolerating PO intake     [] Cleared by PT (OT if applicable)     [] Stair training completed (if applicable)    [] Independent / Contact Guard Assist (household distance)     [] Bed mobility     [] Car transfers     [] ADLs    [x] Adequate pain control on oral medication alone     Pending placement for ongoing rehab    Katiana Sutton NP  Available via [employer]

## 2023-01-19 NOTE — PROGRESS NOTES
Problem: Mobility Impaired (Adult and Pediatric)  Goal: *Acute Goals and Plan of Care (Insert Text)  Description: FUNCTIONAL STATUS PRIOR TO ADMISSION: Patient was independent and active without use of DME.    HOME SUPPORT PRIOR TO ADMISSION: The patient lived alone with no local support. Physical Therapy Goals  Initiated 1/19/2023    1. Patient will move from supine to sit and sit to supine  in bed with modified independence within 4 days. 2. Patient will perform sit to stand with modified independence within 4 days. 3. Patient will ambulate with modified independence for 250 feet with the least restrictive device within 4 days. 4. Patient will ascend/descend 2 stairs with B handrail(s) with modified independence within 4 days. 5. Patient will perform home exercise program per protocol with modified independence within 4 days. 6. Patient will demonstrate AROM 0-90 degrees in operative joint within 4 days. 1/19/2023 1435 by Shirlene Alvarez, PT, DPT  Outcome: Progressing Towards Goal  1/19/2023 1149 by Shirlene Alvarez, PT, DPT  Outcome: Progressing Towards Goal   PHYSICAL THERAPY TREATMENT  Patient: Douglas Richard (62 y.o. female)  Date: 1/19/2023  Diagnosis: Mechanical loosening of internal left knee prosthetic joint ([facility] [address] [T84.033A] <principal problem not specified>  Procedure(s) (LRB):  REVISION OF LEFT TOTAL KNEE ARTHROPLASTY (Left) 1 Day Post-Op  Precautions: Fall, WBAT  Chart, physical therapy assessment, plan of care and goals were reviewed. ASSESSMENT  Patient continues with skilled PT services and is progressing towards goals. Patient overall limited by pain, gait instability, impaired balance, and decreased activity tolerance. Patient currently needing Sulma for bed mobility and CGA for transfers. Amb approx 65 ft x 2 with RW and CGA with no overt LOB but slow isidoro. Patient lives alone and is hope to DC to rehab setting to progress to more independent level of function.   She is a high fall risk and is well below her functional baseline. Anticipate quick progress in intense rehab setting. Other factors to consider for discharge: at risk for falls, below baseline         PLAN :  Patient continues to benefit from skilled intervention to address the above impairments. Continue treatment per established plan of care. to address goals. Recommendation for discharge: (in order for the patient to meet his/her long term goals)  Therapy 3 hours per day 5-7 days per week        IF patient discharges home will need the following DME: to be determined (TBD)       SUBJECTIVE:   Patient stated I'm doing better.     OBJECTIVE DATA SUMMARY:   Critical Behavior:  Neurologic State: Alert, Eyes open spontaneously  Orientation Level: Oriented X4  Cognition: Follows commands       Range of Motion:  AROM: Generally decreased, functional                         Functional Mobility Training:  Bed Mobility:     Supine to Sit: Minimum assistance  Sit to Supine: Minimum assistance  Scooting: Supervision        Transfers:  Sit to Stand: Contact guard assistance  Stand to Sit: Contact guard assistance                             Balance:  Sitting: Intact  Standing: Impaired  Standing - Static: Constant support;Good  Standing - Dynamic : Constant support; Fair  Ambulation/Gait Training:  Distance (ft): 65 Feet (ft) (x 2)  Assistive Device: Gait belt;Walker, rolling  Ambulation - Level of Assistance: Contact guard assistance     Gait Description (WDL): Exceptions to WDL  Gait Abnormalities: Antalgic;Decreased step clearance; Step to gait        Base of Support: Widened  Stance: Left decreased  Speed/Xin: Slow;Pace decreased (<100 feet/min)  Step Length: Left shortened;Right shortened            Pain Rating:  Reports pain but does not rate    Activity Tolerance:   Good and requires rest breaks    After treatment patient left in no apparent distress:   Supine in bed, Call bell within reach, and Side rails x 3    COMMUNICATION/COLLABORATION:   The patients plan of care was discussed with: Physical therapist, Occupational therapist, and Registered nurse.      Arianna Chavez PT, DPT   Time Calculation: 25 mins

## 2023-01-19 NOTE — PROGRESS NOTES
Orthopaedics Daily Progress Note                            Date of Surgery:  1/18/2023      Patient: Madyson Ivy   YOB: 1935  Age: 82 y.o. SUBJECTIVE:   1 Day Post-Op following REVISION OF LEFT TOTAL KNEE ARTHROPLASTY. The patient's post operative pain is controlled. No CP/SOB. No N/V. Ambulated with PT this morning down the verma. Doesn't have anyone to stay with her after surgery, will likely need SNF. OBJECTIVE:     Vital Signs:    Visit Vitals  /64   Pulse 60   Temp 98.1 °F (36.7 °C)   Resp 16   SpO2 96%       Physical Exam:  General: A&Ox3. The patient is cooperative, and in no acute distress. Respiratory: Respirations are unlabored. Surgical site(s): dressing clean, dry  Musculoskeletal: Calves are soft, supple, and non-tender upon palpation. Motor 5/5. Neurological:  Neurovascularly intact with good dorsi and plantar flexion. Pulses symmetrical.    Laboratory Values:             Recent Results (from the past 12 hour(s))   METABOLIC PANEL, BASIC    Collection Time: 01/19/23  4:23 AM   Result Value Ref Range    Sodium 139 136 - 145 mmol/L    Potassium 4.0 3.5 - 5.1 mmol/L    Chloride 109 (H) 97 - 108 mmol/L    CO2 24 21 - 32 mmol/L    Anion gap 6 5 - 15 mmol/L    Glucose 115 (H) 65 - 100 mg/dL    BUN 17 6 - 20 MG/DL    Creatinine 1.10 (H) 0.55 - 1.02 MG/DL    BUN/Creatinine ratio 15 12 - 20      eGFR 49 (L) >60 ml/min/1.73m2    Calcium 8.9 8.5 - 10.1 MG/DL   HEMOGLOBIN    Collection Time: 01/19/23  4:23 AM   Result Value Ref Range    HGB 11.2 (L) 11.5 - 16.0 g/dL   GLUCOSE, POC    Collection Time: 01/19/23 11:03 AM   Result Value Ref Range    Glucose (POC) 100 65 - 117 mg/dL    Performed by [facility]  PCT          PLAN:     S/P REVISION OF LEFT TOTAL KNEE ARTHROPLASTY -Continue WBAT. -Mobilize and continue with PT/OT until discharged     Hemodynamics Hgb today is 11.2. Acute blood loss anemia as expected. Patient asymptomatic. Continue to monitor.      Wound Monitor postop dressing; no postop dressing changes necessary. Reinforce PRN. Post Operative Pain Pain Control: stable, mild-to-moderate joint symptoms intermittently, reasonably well controlled by current meds. DVT Prophylaxis Continue with SCD'S, Ankle Pump Exercises. Eliquis     Discharge Disposition Discharge plan: Rehab / SNF depending on placement options.        Signed By: Edgar Shine PA-C  January 19, 2023 1:48 PM

## 2023-01-19 NOTE — PROGRESS NOTES
Patient assessed for readiness to ambulate. Additional Blood Pressure/Pulse Data  Pulse 2: 64  BP 2: 135/77  BP 2 Location: Left arm  BP Method 2: Automatic  Patient Position 2: Sitting  Pulse 3: 61  BP 3: 151/74  BP 3 Location: Left arm  BP Method 3: Automatic  Patient Position 3: Standing Vital Signs  Level of Consciousness: Alert (0)  Temp: 97.5 °F (36.4 °C)  Temp Source: Oral  Pulse (Heart Rate): 62  Heart Rate Source: Monitor  Cardiac Rhythm: Sinus Rhythm  Resp Rate: 18  BP: 121/72  MAP (Calculated): 88  BP 1 Location: Left upper arm  BP 1 Method: Automatic  BP Patient Position: Lying  MEWS Score: 1  More BP/Pulse rows needed?: Yes. Patient ambulated with assistance of 2 nurses. Patient ambulated with gait belt and walker. Patient walked to Bedside commode. Patient returned safely to bed. I have personally performed a face to face diagnostic evaluation on this patient. I have reviewed the ACP note and agree with the history, exam and plan of care, except as noted.

## 2023-01-19 NOTE — PROGRESS NOTES
A Spiritual Care Partner Volunteer visited patient in Room 560 on 1/19/2023.   Documented by:  Chaplain Sharma MDiv, MS, [facility]

## 2023-01-19 NOTE — PROGRESS NOTES
Problem: Mobility Impaired (Adult and Pediatric)  Goal: *Acute Goals and Plan of Care (Insert Text)  Description: FUNCTIONAL STATUS PRIOR TO ADMISSION: Patient was independent and active without use of DME.    HOME SUPPORT PRIOR TO ADMISSION: The patient lived alone with no local support. Physical Therapy Goals  Initiated 1/19/2023    1. Patient will move from supine to sit and sit to supine  in bed with modified independence within 4 days. 2. Patient will perform sit to stand with modified independence within 4 days. 3. Patient will ambulate with modified independence for 250 feet with the least restrictive device within 4 days. 4. Patient will ascend/descend 2 stairs with B handrail(s) with modified independence within 4 days. 5. Patient will perform home exercise program per protocol with modified independence within 4 days. 6. Patient will demonstrate AROM 0-90 degrees in operative joint within 4 days. Outcome: Progressing Towards Goal   PHYSICAL THERAPY EVALUATION  Patient: Steff Hess (28 y.o. 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 Patient currently limited by pain, gait instability, impaired balance, and decreased activity tolerance. Currently up in bedside chair with reports of some continued L lower leg numbness and pain. Overall CGA for transfers and able to amb approx 60 feet x 2 with RW and CGA. Patient displays slow isidoro and is generally steady. Patient lives alone and is hope to DC to rehab setting to progress to more independent level of function. She is a high fall risk and is well below her functional baseline.   Anticipate quick progress in intense rehab setting. Other factors to consider for discharge: at risk for falls, below baseline, lives alone     Patient will benefit from skilled therapy intervention to address the above noted impairments. PLAN :  Recommendations and Planned Interventions: bed mobility training, transfer training, gait training, therapeutic exercises, neuromuscular re-education, patient and family training/education, and therapeutic activities      Frequency/Duration: Patient will be followed by physical therapy:  twice daily to address goals. Recommendation for discharge: (in order for the patient to meet his/her long term goals)  Therapy 3 hours per day 5-7 days per week. If she does not qualify for IPR then recommend SNF rehab vs  PT        IF patient discharges home will need the following DME: patient owns DME required for discharge         SUBJECTIVE:   Patient stated I don't have anyone to help me at home.     OBJECTIVE DATA SUMMARY:   HISTORY:    Past Medical History:   Diagnosis Date    Adverse effect of anesthesia     \"lost\" 4 days on Morphine-BACK SURGERY    Arrhythmia 12/28/2011    DR Chris Burrell DCd DILTIAZEM DT NO ISSUES 2014, PT STATES SHE WENT INTO AFIB DURING RIGHT KNEE SURGERY IN 2014    Arthritis     Chronic pain     BOTH KNEES    GERD (gastroesophageal reflux disease)     CONTROLLED WITH OMEPRAZOLE    Hypertension     Ill-defined condition     constipation    Sebaceous cyst 04/14/2014    Snores      Past Surgical History:   Procedure Laterality Date    HX APPENDECTOMY  1953    HX BREAST BIOPSY  2008    HX BREAST REDUCTION  2006    HX CATARACT REMOVAL Bilateral     HX CERVICAL FUSION  1981    HX COLONOSCOPY      HX CYST INCISION AND DRAINAGE Left 11/15/2018    excision of sebaceous cyst left shoulder     HX DILATION AND CURETTAGE  1973    HX GI      appendectomy    HX GI  1975    hemorrhoidectomy    HX HEENT      neck surgery cervical discectomy    HX HERNIA REPAIR  1974 UMBILICAL    HX HYSTERECTOMY  1976    VAGINAL    HX KNEE REPLACEMENT Left 08/11/2016    HX MOHS PROCEDURES  2005    right shoulder    HX ORTHOPAEDIC  1991    left leg/ankle fx repair    HX ORTHOPAEDIC  2004    right BUNIONECTOMY    HX ORTHOPAEDIC  2009    RIGHT HAMMERTOE    HX ORTHOPAEDIC  2015    R TOTAL KNEE    HX ORTHOPAEDIC  2012    back fusion    HX OTHER SURGICAL  05/15/2013    surgery on neck glands/Parathyroid removed    HX OTHER SURGICAL  04/14/2014    cyst removed from back    HX PARATHYROIDECTOMY  2013    HX UROLOGICAL  2012    lithotrypsy , 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        Personal factors and/or comorbidities impacting plan of care:     Home Situation  Home Environment: Private residence  # Steps to Enter: 2  Rails to Enter: Yes  Hand Rails : Bilateral  One/Two Story Residence: Two story, live on 1st floor  Living Alone: Yes  Support Systems: No Support Systems  Current DME Used/Available at Home: Walker, rolling    EXAMINATION/PRESENTATION/DECISION MAKING:   Critical Behavior:  Neurologic State: Alert, Eyes open spontaneously  Orientation Level: Oriented X4  Cognition: Follows commands       Range Of Motion:  AROM: Generally decreased, functional                       Strength:    Strength: Generally decreased, functional       Functional Mobility:  Bed Mobility:   Not tested           Transfers:  Sit to Stand: Contact guard assistance  Stand to Sit: Contact guard assistance                       Balance:   Sitting: Intact  Standing: Impaired  Standing - Static: Constant support;Good  Standing - Dynamic : Constant support; Fair  Ambulation/Gait Training:  Distance (ft): 60 Feet (ft) (x 2)  Assistive Device: Gait belt;Walker, rolling  Ambulation - Level of Assistance: Contact guard assistance     Gait Description (WDL): Exceptions to WDL  Gait Abnormalities: Antalgic;Decreased step clearance; Step to gait        Base of Support: Widened  Stance: Left decreased  Speed/Xin: Slow;Pace decreased (<100 feet/min)  Step Length: Left shortened;Right shortened                   Pain Rating:  Reports pain but does not rate    Activity Tolerance:   Fair and requires rest breaks    After treatment patient left in no apparent distress:   Sitting in chair and Call bell within reach    COMMUNICATION/EDUCATION:   The patients plan of care was discussed with: Physical therapist, Occupational therapist, and Registered nurse. Fall prevention education was provided and the patient/caregiver indicated understanding., Patient/family have participated as able in goal setting and plan of care. , and Patient/family agree to work toward stated goals and plan of care.     Thank you for this referral.  Dorina Parra, PT, DPT   Time Calculation: 16 mins

## 2023-01-19 NOTE — PROGRESS NOTES
Occupational Therapy  1/19/2023    OT orders received and acknowledged. Patient is POD #1 revision L TKA and WBAT LLE. She lives alone and does not have anyone who can stay with her at discharge. Patient is requesting rehab at discharge. Attempted OT evaluation at [phone], however patient just got back into bed after sitting up all morning and requested a rest break prior to working with therapy again today. OT will defer and follow up at a later time.      Yoselin Sotomayor, OTR/L

## 2023-01-19 NOTE — OP NOTES
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 PROCEDURE PERFORMED:  Revision left total knee arthroplasty, femoral and tibial components. SURGEON:  Elizabeth Raymond MD    FIRST ASSISTANT:  Michel Cortes PA-C    ANESTHESIA:  Spinal with sedation as well as adductor canal block. COMPLICATIONS:  None. SPECIMENS REMOVED:  Culture x3 and frozen section x1. IMPLANTS:  Juan Persona size 7 plus revision femur with 16 x 135 mm cementless stem, 10-mm distal medial augment, 5-mm distal lateral augment, 5-mm posteromedial and posterolateral augments; size D tibial tray with 10 x 135 mm cementless stem, size extra small trabecular metaphysis tibial cone, and 20 mm CPS polyethylene insert. ESTIMATED BLOOD LOSS:  100 mL. INDICATIONS:  The patient is an 82-year-old female who underwent left primary total knee replacement approximately 6 years ago. Over the last year, she has had progressive pain, and plain x-rays of the left knee are consistent with loosening of tibial component, with significant migration into varus. Infection markers were normal.  She presents for revision left total knee replacement. Risks, benefits, alternatives of procedure were reviewed with her in detail and she desires to proceed. PROCEDURE:  Anesthesia team placed an adductor canal block in the left thigh before taking the patient to the operating room and they also placed a spinal.  Preoperative IV antibiotics were administered. Padded pneumatic tourniquet was placed around left upper thigh. Left lower extremity was prepped and draped in the usual sterile fashion.   Tourniquet was inflated to 275 by utilizing existing midline anterior knee scar and extended it proximal to perform a medial parapatellar arthrotomy with a quadriceps snip. Joint fluid appeared grossly normal.  There was diffuse villonodular synovitis consistent with loosening. The tibial component was grossly loose. Progressive medial release was performed to facilitate exposure and soft tissue balance throughout the procedure. Synovectomy was performed. Polyethylene insert was removed. I worked around the margins of the femoral component with flexible osteotomes and removed the femur in retrograde fashion with no bone loss. The tibia was delivered anteriorly and the tibial component was easily removed by hand. A sample of synovial fluid and synovial tissue had been sent for culture upon entering the knee joint, and at this point sample of the tibial interface membrane was also collected for culture. Tibial interface membrane specimen was sent for frozen section which demonstrated no evidence of acute inflammation. The cement mantle on the tibia was fragmented medially and was easily removed. Lateral cement mantle was removed with osteotomes. Fresh proximal tibial resection was performed using an extramedullary alignment guide. This left a little bit of a medial defect which was partially contained. Metaphyseal cement was removed centrally. Tibial canal was reamed up to 10 mm for 10 x 135 mm cementless stem. Trial stem was placed distally and I broached over it proximally for metaphyseal tibial cone, size extra small. The cone trial was placed. Tibial tray was sized to a D and trial tray was placed with the 10 x 135 mm stem trial.  The femoral canal was reamed up to 16 mm for a 16 x 135 mm cementless stem. Reamer was left in place and fresh distal femoral resection was performed, skimming the lateral side with a neutral cut, and cutting the medial side for a 5 mm augment back to supportive bone.   A size 7 femoral trial was placed to preliminarily assess gaps. A little bit of posterior release was required at the back of the femur, a little additional medial release to produce a symmetrical extension gap. By augmenting distally with a 10 mm augment medially and a 5 mm augment laterally, slightly internally rotating the femur a few degrees and putting 5 mm posterior augments medially and laterally, flexion/extension gaps were balanced satisfactorily with a 20-mm trial insert. The knee had full extension to gravity. Trials were removed and bony surfaces were copiously irrigated by pulse lavage and dried. The metaphyseal cone was impacted in the tibia. Antibiotic-impregnated cement was then utilized to cement both components into place using surface and metaphyseal cement technique with cementless stems. Knee was reduced in extension with the trial insert in place. Periarticular soft tissues were injected with solution containing 0.5% ropivacaine with epinephrine as well as clonidine and Toradol. Tourniquet was released. Wound was irrigated. Hemostasis was obtained with the Bovie. After adequate setup time, the real 20-mm CPS polyethylene insert was impacted. The quadriceps snip was repaired proximally with interrupted #2 Ethibond sutures. The arthrotomy was anchored near the proximal medial pole of the patella with a few additional Ethibond sutures. Remainder of the arthrotomy was closed with a combination of heavy Vicryl sutures and a running #2 Stratafix suture. Skin and subcutaneous layers were closed in layered fashion with Vicryl and a running Monocryl subcuticular stitch. Wound was dressed with Dermabond and Aquacel occlusive dressing as  well as a sterile compressive dressing. Villarreal catheter had been inserted prior to the procedure and was removed at this point. The patient was transported to postanesthesia care unit in stable condition.   All counts were correct at the end of the procedure. The physician assistant was critical throughout the procedure to assist with patient positioning, retraction, and closure.   There were no [employer] residents or fellows available to Ed Henley MD      [id number]  D:  01/18/2023 16:53  T:  01/18/2023 23:04  JOB #:  [id number]  CC:  Sigrid Tomlin MD

## 2023-01-19 NOTE — PROGRESS NOTES
Problem: Self Care Deficits Care Plan (Adult)  Goal: *Acute Goals and Plan of Care (Insert Text)  Description: FUNCTIONAL STATUS PRIOR TO ADMISSION: Patient was independent and active without use of DME, reports occasional use of RW for mobility. HOME SUPPORT: The patient lived with local family but did not require assist.    Occupational Therapy Goals  Initiated 1/19/2023    1. Patient will perform lower body dressing at minimal assistance/contact guard assist within 7 days. 2. Patient will perform toilet transfers at supervision/set-upwithin 7 days. 3. Patient will perform all aspects of toileting at modified independence within 7 days. 4. Patient will tolerate greater than 5 minutes of standing to engage in grooming tasks without a rest break at supervision/set-up within 7 days. Outcome: Progressing Towards Goal   OCCUPATIONAL THERAPY EVALUATION  Patient: Clifton Trevino (5 y.o. 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 Patient received supine in bed, endorsing increased fatigue but agreeable to OT evaluation. Patient provide with simulated leg , requiring min A to manage LLE to EOB and increased time. Patient able to transition to standing at RW level, min A to maintain balance and verbal cues for hand placement. Patient able to briefly side step however returned to sitting at EOB 2/2 fatigue. Patient educated on LB dressing techniques with excellent carryover/understanding however still requring phsycail assist to complete. Returned to supine in bed at conclusion of session, all needs met.  Recommend IPR at [place] as patient is high fall risk, lives alone,a dn is well below baseline LOF. Current Level of Function Impacting Discharge (ADLs/self-care):   Feeding: Independent    Oral Facial Hygiene/Grooming: Independent    Bathing: Moderate assistance    Upper Body Dressing: Minimum assistance    Lower Body Dressing: Moderate assistance    Toileting: Minimum assistance       Functional Outcome Measure: The patient scored 50/100 on the barthel outcome measure which is indicative of partial dependence. Other factors to consider for discharge: L TKA, below baseline, lives alone     Patient will benefit from skilled therapy intervention to address the above noted impairments. PLAN :  Recommendations and Planned Interventions: self care training, functional mobility training, therapeutic exercise, balance training, therapeutic activities, endurance activities, neuromuscular re-education, patient education, home safety training, and family training/education    Frequency/Duration: Patient will be followed by occupational therapy 5 times a week to address goals. Recommendation for discharge: (in order for the patient to meet his/her long term goals)  Therapy 3 hours per day 5-7 days per week    This discharge recommendation:  Has been made in collaboration with the attending provider and/or case management    IF patient discharges home will need the following DME: defer to IPR       SUBJECTIVE:   Patient stated [employer] you for helping me.     OBJECTIVE DATA SUMMARY:   HISTORY:   Past Medical History:   Diagnosis Date    Adverse effect of anesthesia     \"lost\" 4 days on Morphine-BACK SURGERY    Arrhythmia 12/28/2011    DR Philip Coyne DCd DILTIAZEM DT NO ISSUES 2014, PT STATES SHE WENT INTO AFIB DURING RIGHT KNEE SURGERY IN 2014    Arthritis     Chronic pain     BOTH KNEES    GERD (gastroesophageal reflux disease)     CONTROLLED WITH OMEPRAZOLE    Hypertension     Ill-defined condition     constipation Sebaceous cyst 04/14/2014    Snores      Past Surgical History:   Procedure Laterality Date    HX APPENDECTOMY  1953    HX BREAST BIOPSY  2008    HX BREAST REDUCTION  2006    HX CATARACT REMOVAL Bilateral     HX CERVICAL FUSION  1981    HX COLONOSCOPY      HX CYST INCISION AND DRAINAGE Left 11/15/2018    excision of sebaceous cyst left shoulder     HX DILATION AND CURETTAGE  1973    HX GI      appendectomy    HX GI  1975    hemorrhoidectomy    HX HEENT      neck surgery cervical discectomy    HX HERNIA REPAIR  9164    UMBILICAL    HX HYSTERECTOMY  1976    VAGINAL    HX KNEE REPLACEMENT Left 08/11/2016    HX MOHS PROCEDURES  2005    right shoulder    HX ORTHOPAEDIC  1991    left leg/ankle fx repair    HX ORTHOPAEDIC  2004    right BUNIONECTOMY    HX ORTHOPAEDIC  2009    RIGHT HAMMERTOE    HX ORTHOPAEDIC  2015    R TOTAL KNEE    HX ORTHOPAEDIC  2012    back fusion    HX OTHER SURGICAL  05/15/2013    surgery on neck glands/Parathyroid removed    HX OTHER SURGICAL  04/14/2014    cyst removed from back    HX PARATHYROIDECTOMY  2013    HX UROLOGICAL  2012    lithotrypsy , 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        Expanded or extensive additional review of patient history:     Home Situation  Home Environment: Private residence  # Steps to Enter: 2  Rails to Enter: Yes  Hand Rails : Bilateral  One/Two Story Residence: Two story, live on 1st floor  Living Alone: Yes  Support Systems: Child(elba)  Patient Expects to be Discharged to[de-identified] Skilled nursing facility  Current DME Used/Available at Home: Walker, rolling        EXAMINATION OF PERFORMANCE DEFICITS:  Cognitive/Behavioral Status:  Neurologic State: Alert  Orientation Level: Oriented X4  Cognition: Appropriate decision making  Perception: Appears intact  Perseveration: No perseveration noted  Safety/Judgement: Awareness of environment      Hearing:   Auditory  Auditory Impairment: None    Vision/Perceptual:                           Acuity: Within Defined Limits    Corrective Lenses: Glasses    Range of Motion:    AROM: Generally decreased, functional  PROM: Generally decreased, functional                      Strength:    Strength: Generally decreased, functional                Coordination:  Coordination: Generally decreased, functional  Fine Motor Skills-Upper: Left Intact; Right Intact    Gross Motor Skills-Upper: Left Intact; Right Intact    Tone & Sensation:    Tone: Normal  Sensation: Intact          Balance:  Sitting: Impaired  Sitting - Static: Good (unsupported)  Sitting - Dynamic: Good (unsupported)  Standing: Impaired  Standing - Static: Constant support;Good  Standing - Dynamic : Constant support; Fair    Functional Mobility and Transfers for ADLs:  Bed Mobility:  Supine to Sit: Minimum assistance  Sit to Supine: Minimum assistance  Scooting: Supervision    Transfers:  Sit to Stand: Contact guard assistance  Stand to Sit: Setup    ADL Assessment:  Feeding: Independent    Oral Facial Hygiene/Grooming: Independent    Bathing: Moderate assistance      Upper Body Dressing: Minimum assistance    Lower Body Dressing: Moderate assistance    Toileting: Minimum assistance                ADL Intervention and task modifications:          Lower Body Dressing Assistance  Dressing Assistance: Moderate assistance (adaptive dressing simulation (pants))         Cognitive Retraining  Safety/Judgement: Awareness of environment    Dressing joint: Patient instructed and demonstrated to don/doff Left LE first/last moderate cues. Patient instructed and demonstrated to don all clothing while sitting prior to standing, doff all clothing to knees while standing, then sit to doff clothing off from knees to feet to facilitate fall prevention, pain management, and energy conservation with Moderate Assistance.       Functional Measure:    Barthel Index:  Bathin  Bladder: 5  Bowels: 10  Groomin  Dressin  Feeding: 10  Mobility: 0  Stairs: 0  Toilet Use: 5  Transfer (Bed to Chair and Back): 10  Total: 50/100      The Barthel ADL Index: Guidelines  1. The index should be used as a record of what a patient does, not as a record of what a patient could do. 2. The main aim is to establish degree of independence from any help, physical or verbal, however minor and for whatever reason. 3. The need for supervision renders the patient not independent. 4. A patient's performance should be established using the best available evidence. Asking the patient, friends/relatives and nurses are the usual sources, but direct observation and common sense are also important. However direct testing is not needed. 5. Usually the patient's performance over the preceding 24-48 hours is important, but occasionally longer periods will be relevant. 6. Middle categories imply that the patient supplies over 50 per cent of the effort. 7. Use of aids to be independent is allowed. Score Interpretation (from [address] [facility] [address])    Independent   60-79 Minimally independent   40-59 Partially dependent   20-39 Very dependent   <20 Totally dependent     -Clark Flores., Barthel, D.W. (1965). Functional evaluation: the Barthel Index. [address] [facility] ([address] (1997). The Barthel activities of daily living index: self-reporting versus actual performance in the old (> or = 75 years). Journal [address] 45(7), [address] [facility], J.J.M., Kerri Mendez., Dilshad Baez. (1999). Measuring the change in disability after inpatient rehabilitation; comparison of the responsiveness of the Barthel Index and Functional [place] Measure. Journal of Neurology, Neurosurgery, and Psychiatry, 66(4), 246-725. MYRA Almonte.LONNY, MICHAEL Pham, & Avel Dominguez, MKarinaA. (2004) Assessment of post-stroke quality of life in cost-effectiveness studies: The usefulness of the Barthel Index and the EuroQoL-5D.  Quality of Life Research, 15, 512-68        Occupational Therapy Evaluation Charge Determination   History Examination Decision-Making   LOW Complexity : Brief history review  LOW Complexity : 1-3 performance deficits relating to physical, cognitive , or psychosocial skils that result in activity limitations and / or participation restrictions  LOW Complexity : No comorbidities that affect functional and no verbal or physical assistance needed to complete eval tasks       Based on the above components, the patient evaluation is determined to be of the following complexity level: LOW   Pain Rating:  Pt did endorse pain in surgical knee, did not quantify. Activity Tolerance:   Fair and requires rest breaks    After treatment patient left in no apparent distress:    Supine in bed, Call bell within reach, Caregiver / family present, and Side rails x 3    COMMUNICATION/EDUCATION:   The patients plan of care was discussed with: Physical therapist, Occupational therapist, Registered nurse, Case management, and ortho NP . Patient/family have participated as able in goal setting and plan of care. This patients plan of care is appropriate for delegation to \[facility]\"".     Thank you for this referral.  Elham Drew OT  Time Calculation: 11 mins

## 2023-01-19 NOTE — ANESTHESIA POSTPROCEDURE EVALUATION
Post-Anesthesia Evaluation and Assessment    Patient: Ryan Boo MRN: [id number]  SSN: [id number]    YOB: 1935  Age: 82 y.o. Sex: female      I have evaluated the patient and they are stable and ready for discharge from the PACU. Cardiovascular Function/Vital Signs  Visit Vitals  /64   Pulse 60   Temp 36.7 °C (98.1 °F)   Resp 16   SpO2 96%       Patient is status post Spinal anesthesia for Procedure(s):  REVISION OF LEFT TOTAL KNEE ARTHROPLASTY. Nausea/Vomiting: None    Postoperative hydration reviewed and adequate. Pain:  Pain Scale 1: Numeric (0 - 10) (01/18/23 2148)  Pain Intensity 1: 6 (01/18/23 2148)   Managed    Neurological Status:   Neuro (WDL): Within Defined Limits (01/18/23 1745)  Neuro  Neurologic State: Alert (01/18/23 2024)  Orientation Level: Oriented X4 (01/18/23 2024)  Cognition: Follows commands (01/18/23 2024)  LUE Motor Response: Purposeful (01/18/23 2024)  LLE Motor Response: Purposeful;Weak (01/18/23 2024)  RUE Motor Response: Purposeful (01/18/23 2024)  RLE Motor Response: Purposeful (01/18/23 2024)   At baseline    Mental Status, Level of Consciousness: Alert and  oriented to person, place, and time    Pulmonary Status:   O2 Device: None (Room air) (01/18/23 1745)   Adequate oxygenation and airway patent    Complications related to anesthesia: None    Post-anesthesia assessment completed.  No concerns    Signed By: Bernarda Romberg, MD     January 19, 2023

## 2023-01-20 VITALS
HEART RATE: 98 BPM | RESPIRATION RATE: 16 BRPM | WEIGHT: 204.59 LBS | OXYGEN SATURATION: 95 % | TEMPERATURE: 98.9 F | DIASTOLIC BLOOD PRESSURE: 74 MMHG | SYSTOLIC BLOOD PRESSURE: 113 MMHG | BODY MASS INDEX: 35.12 KG/M2

## 2023-01-20 PROCEDURE — 97116 GAIT TRAINING THERAPY: CPT

## 2023-01-20 PROCEDURE — 51798 US URINE CAPACITY MEASURE: CPT

## 2023-01-20 PROCEDURE — 74011250637 HC RX REV CODE- 250/637: Performed by: PHYSICIAN ASSISTANT

## 2023-01-20 PROCEDURE — 97530 THERAPEUTIC ACTIVITIES: CPT

## 2023-01-20 PROCEDURE — 74011250637 HC RX REV CODE- 250/637: Performed by: ORTHOPAEDIC SURGERY

## 2023-01-20 RX ORDER — FAMOTIDINE 20 MG/1
20 TABLET, FILM COATED ORAL EVERY EVENING
Status: DISCONTINUED | OUTPATIENT
Start: 2023-01-20 | End: 2023-01-20 | Stop reason: HOSPADM

## 2023-01-20 RX ADMIN — METOPROLOL SUCCINATE 50 MG: 50 TABLET, EXTENDED RELEASE ORAL at 10:38

## 2023-01-20 RX ADMIN — FAMOTIDINE 20 MG: 20 TABLET, FILM COATED ORAL at 17:33

## 2023-01-20 RX ADMIN — SENNOSIDES AND DOCUSATE SODIUM 1 TABLET: 50; 8.6 TABLET ORAL at 10:38

## 2023-01-20 RX ADMIN — POLYETHYLENE GLYCOL 3350 17 G: 17 POWDER, FOR SOLUTION ORAL at 10:38

## 2023-01-20 RX ADMIN — SENNOSIDES AND DOCUSATE SODIUM 1 TABLET: 50; 8.6 TABLET ORAL at 17:33

## 2023-01-20 RX ADMIN — APIXABAN 2.5 MG: 2.5 TABLET, FILM COATED ORAL at 17:37

## 2023-01-20 RX ADMIN — ACETAMINOPHEN 500 MG: 500 TABLET, FILM COATED ORAL at 10:38

## 2023-01-20 RX ADMIN — APIXABAN 2.5 MG: 2.5 TABLET, FILM COATED ORAL at 10:38

## 2023-01-20 NOTE — PROGRESS NOTES
Patient heart rate fluctuating 100-150's. Patient asymptomatic no complaint of palpitation. /89 now. Manual pulse rate irregular.

## 2023-01-20 NOTE — PROGRESS NOTES
Renal Dosing/Monitoring  Medication: Famotidine   Current regimen:  40 every 24 hr  Recent Labs     01/19/23  0423   CREA 1.10*   BUN 17     Estimated CrCl:  41.3 ml/min  Plan: Change to famotidine 20 mg PO every 24 hours per [facility] P&T Committee Protocol with respect to renal function (CrCl <50 ml/min). Pharmacy will continue to monitor patient daily and will make dosage adjustments based upon changing renal function.

## 2023-01-20 NOTE — PROGRESS NOTES
Had some mild confusion overnight. Denies chest pain or shortness of breath. Had to be straight cathed again last night. She was voiding fine yesterday but was straight cathed the night before as well. Pain is controlled.     Alert  Left knee dressing dry  Calf soft and nontender  No motor or sensory deficits    Postop day 2 revision left total knee  -PT  -Half dose Eliquis for 5 days postop before resuming full dose  -pain control;  -Acute versus skilled rehab    Estefanía Mobley MD

## 2023-01-20 NOTE — PROGRESS NOTES
Problem: Mobility Impaired (Adult and Pediatric)  Goal: *Acute Goals and Plan of Care (Insert Text)  Description: FUNCTIONAL STATUS PRIOR TO ADMISSION: Patient was independent and active without use of DME.    HOME SUPPORT PRIOR TO ADMISSION: The patient lived alone with no local support. Physical Therapy Goals  Initiated 1/19/2023    1. Patient will move from supine to sit and sit to supine  in bed with modified independence within 4 days. 2. Patient will perform sit to stand with modified independence within 4 days. 3. Patient will ambulate with modified independence for 250 feet with the least restrictive device within 4 days. 4. Patient will ascend/descend 2 stairs with B handrail(s) with modified independence within 4 days. 5. Patient will perform home exercise program per protocol with modified independence within 4 days. 6. Patient will demonstrate AROM 0-90 degrees in operative joint within 4 days. Outcome: Progressing Towards Goal   PHYSICAL THERAPY TREATMENT  Patient: Coral Orourke (88 y.o. female)  Date: 1/20/2023  Diagnosis: Mechanical loosening of internal left knee prosthetic joint ([facility] [address] [T84.033A] <principal problem not specified>  Procedure(s) (LRB):  REVISION OF LEFT TOTAL KNEE ARTHROPLASTY (Left) 2 Days Post-Op  Precautions: Fall, WBAT  Chart, physical therapy assessment, plan of care and goals were reviewed. ASSESSMENT  Patient continues with skilled PT services and is progressing towards goals. She demonstrates the ability to achieve sit<>stand with CGA and increased time. Patient requires CGA in order to achieve and maintain immediate standing balance at RW. She ambulates with decreased gait speed and step to gait pattern. Patient ambulates in to restroom to void and then ambulates additional 20 ft with RW. She is provided Min A for bringing L LE back in to the bed. Ice pack is applied.       Current Level of Function Impacting Discharge (mobility/balance): Min A     Other factors to consider for discharge: medical stability, decreased strength/endurance, increased need for assistance         PLAN :  Patient continues to benefit from skilled intervention to address the above impairments. Continue treatment per established plan of care. to address goals. Recommendation for discharge: (in order for the patient to meet his/her long term goals)  Therapy 3 hours per day 5-7 days per week    This discharge recommendation:  Has been made in collaboration with the attending provider and/or case management    IF patient discharges home will need the following DME: to be determined (TBD)       SUBJECTIVE:   Patient stated It hurts really bad right in the knee.     OBJECTIVE DATA SUMMARY:   Critical Behavior:  Neurologic State: Alert  Orientation Level: Oriented X4  Cognition: Appropriate for age attention/concentration  Safety/Judgement: Awareness of environment  Functional Mobility Training:  Bed Mobility:        Sit to Supine: Minimum assistance           Transfers:  Sit to Stand: Contact guard assistance  Stand to Sit: Contact guard assistance                             Balance:  Sitting: Impaired  Sitting - Static: Good (unsupported)  Sitting - Dynamic: Good (unsupported)  Standing: Impaired  Standing - Static: Constant support;Good  Standing - Dynamic : Constant support; Fair  Ambulation/Gait Training:  Distance (ft): 35 Feet (ft)  Assistive Device: Gait belt;Walker, rolling  Ambulation - Level of Assistance: Contact guard assistance        Gait Abnormalities: Antalgic;Decreased step clearance; Step to gait        Base of Support: Widened  Stance: Left decreased  Speed/Xin: Pace decreased (<100 feet/min)  Step Length: Right shortened;Left shortened                    Stairs:               Therapeutic Exercises:     Pain Rating:      Activity Tolerance:   Good    After treatment patient left in no apparent distress:   Supine in bed, Call bell within reach, and Side rails x 3    COMMUNICATION/COLLABORATION:   The patients plan of care was discussed with: Registered nurse.      Alivia Hernandez, PT   Time Calculation: 23 mins

## 2023-01-20 NOTE — DISCHARGE SUMMARY
Ortho Discharge Summary    Patient ID:  Andrea Casanova  [id number]  female  82 y.o.  1935    Admit date: 1/18/2023    Discharge date: 1/20/2023    Admitting Physician: Ramon Fairchild MD     Consulting Physician(s):   Treatment Team: Attending Provider: Cole Cho MD; Care Manager: Arianna Dumont; Physical Therapist: Socorro Adhikari, PT; Occupational Therapist: James Childs OT; Primary Nurse: Renetta Cano, JOSIE; Care Manager: Delmy Yi BSW; Student Nurse: Qing Boyd    Date of Surgery:   1/18/2023     Preoperative Diagnosis:  STATUS POST LEFT TOTAL KNEE REPLACEMENT, MECHANICAL LOOSENING OF INTERNAL LEFT KNEE PROSTHETIC JOINT    Postoperative Diagnosis:   STATUS POST LEFT TOTAL KNEE REPLACEMENT, MECHANICAL LOOSENING OF INTERNAL LEFT KNEE PROSTHETIC JOINT    Procedure(s):   REVISION OF LEFT TOTAL KNEE ARTHROPLASTY     Anesthesia Type:   Spinal     Surgeon: Cole Cho MD                            HPI:  Pt is a 82 y.o. female who has a history of STATUS POST LEFT TOTAL KNEE REPLACEMENT, MECHANICAL LOOSENING OF INTERNAL LEFT KNEE PROSTHETIC JOINT  with pain and limitations of activities of daily living who presents at this time for a left REVISION OF LEFT TOTAL KNEE ARTHROPLASTY following the failure of conservative management. PMH:   Past Medical History:   Diagnosis Date    Adverse effect of anesthesia     \"lost\" 4 days on Morphine-BACK SURGERY    Arrhythmia 12/28/2011    DR Kim Rodas DCd DILTIAZEM DT NO ISSUES 2014, PT STATES SHE WENT INTO AFIB DURING RIGHT KNEE SURGERY IN 2014    Arthritis     Chronic pain     BOTH KNEES    GERD (gastroesophageal reflux disease)     CONTROLLED WITH OMEPRAZOLE    Hypertension     Ill-defined condition     constipation    Sebaceous cyst 04/14/2014    Snores        Body mass index is 35.12 kg/m². : A BMI > 30 is classified as obesity and > 40 is classified as morbid obesity.      Medications upon admission :   Prior to Admission Medications   Prescriptions Last Dose Informant Patient Reported? Taking? cholecalciferol, vitamin D3, (VITAMIN D3 PO) 1/11/2023  Yes Yes   Sig: Take 1 Tablet by mouth daily. famotidine (PEPCID) 40 mg tablet 1/17/2023  Yes Yes   Sig: Take 40 mg by mouth every evening. furosemide (LASIX) 40 mg tablet 1/17/2023  Yes Yes   Sig: Take 40 mg by mouth daily. Indications: SWELLING IN ANKLES; LEFT SWELLS MORE FOLLOWING FRACTURES   isosorbide mononitrate ER (IMDUR) 30 mg tablet 1/18/2023  Yes Yes   Sig: Take 30 mg by mouth every morning. melatonin 5 mg tablet 1/11/2023  Yes Yes   Sig: Take 5 mg by mouth nightly. metoprolol succinate (TOPROL-XL) 50 mg XL tablet 1/18/2023 at 0530  Yes Yes   Sig: Take 50 mg by mouth daily. multivitamin (ONE A DAY) tablet 1/11/2023  Yes Yes   Sig: Take 1 Tablet by mouth every evening. polyethylene glycol (MIRALAX) 17 gram/dose powder 1/17/2023  Yes Yes   Sig: Take 17 g by mouth daily. pravastatin (PRAVACHOL) 40 mg tablet 1/17/2023  Yes Yes   Sig: Take 40 mg by mouth nightly. warfarin (COUMADIN) 5 mg tablet Not Taking  Yes No   Sig: Take 2.5 mg by mouth every evening. Patient not taking: Reported on 1/18/2023      Facility-Administered Medications: None        Allergies: Allergies   Allergen Reactions    Codeine Nausea and Vomiting    Demerol [Meperidine] Palpitations and Other (comments)     \"made me hyper\"    Morphine Other (comments)     \"disoriented\"    Penicillin G Hives and Swelling     EYE SWELLING  Patient screened for any delayed non-IgE-mediated reaction to PCN. Patient notes the following:    No delayed non-IgE-mediated reaction to [facility]                 Hospital Course: The patient underwent surgery. Complications:  None; patient tolerated the procedure well. Was taken to the PACU in stable condition and then transferred to the ortho floor. Mobilized by nursing on day of surgery. Therapy continued throughout hospitalization.   Recommendation made for placement due to limited support in home setting, advanced age, pain, gait instability, impaired balance, and decreased activity tolerance. Referrals sent to IPR and SNF for placement for rehab for increased independent prior to returning to private residence. Of note, patient with  occasional urinary retention during hospitalization. Overnight into POD 1, patient with urinary retention. Although voiding small amounts, bladder scanned for 350 mL +. Straight cathed. During the day on POD 1, patient voiding without difficulty. Bladder scanned for PVR: 115 mL. Patient again experienced difficulty voiding overnight into POD 2. She continued to void small amounts but was bladder scanned for 680 mL and then straight cathed for 675 mL. On POD 2, she again was voiding during day with PVR bladder scan (delayed by approximately 1 hour from void): 188. Called and discussed with liaison at [facility]) with plan for IPR to continue to follow after discharge. Perioperative Antibiotics:  Ancef     Postoperative Pain Management:  Oxycodone & Tylenol    DVT Prophylaxis: Eliquis 2.5 mg PO BID for 5 days following surgery, then resume Eliquis 5 mg PO BID (resume on 1/24/23)    Postoperative transfusions:    Number of units banked PRBCs =   none     Post Op complications: none    Hemoglobin at discharge:    Lab Results   Component Value Date/Time    HGB 11.2 (L) 01/19/2023 04:23 AM    INR 2.0 (H) 12/21/2022 12:48 PM    INR 2.6 (H) 12/08/2022 02:21 PM       Aquacel dressing remained in place. No significant erythema or swelling. Wound appears to be healing without any evidence of infection. Neurovascular exam found to be within normal limits. Physical Therapy started following surgery and participated in bed mobility, transfers and ambulation.         Gait:  Gait  Base of Support: Widened  Speed/Xin: Pace decreased (<100 feet/min)  Step Length: Right shortened, Left shortened  Stance: Left decreased  Gait Abnormalities: Antalgic, Decreased step clearance, Step to gait  Ambulation - Level of Assistance: Contact guard assistance  Distance (ft): 35 Feet (ft)  Assistive Device: Gait belt, Walker, rolling                   Discharged to: Home. Condition on Discharge:   stable    Discharge instructions:  - Anticoagulate with Eliquis 2.5 mg PO BID for 5 days following surgery, then resume Eliquis 5 mg PO BID (resume on 1/24/23)  - Take pain medications as prescribed  - Resume pre hospital diet      - Discharge activity: activity as tolerated  - Ambulate with assistive device as needed. - Weight bearing status as tolerated  - Wound Care Keep wound clean and dry. See discharge instruction sheet. - Please monitor urinary output and bladder scan with intervention as needed for retention. Patient has been straight cathed twice in hospital prior to discharge. -DISCHARGE MEDICATION LIST     Current Discharge Medication List        START taking these medications    Details   oxyCODONE IR (ROXICODONE) 5 mg immediate release tablet Take 0.5-1 Tablets by mouth every four (4) hours as needed for Pain for up to 7 days. Max Daily Amount: 30 mg.  Qty: 42 Tablet, Refills: 0  Start date: 1/19/2023, End date: 1/26/2023    Associated Diagnoses: S/P revision of total knee, left      !! apixaban (Eliquis) 2.5 mg tablet Take 1 Tablet by mouth two (2) times a day for 5 days. Please take Eliquis 2.5 mg PO BID for 5 days post operative (1/19/23 through 1/23/22). On 1/24/23, please resume Eliquis 5 mg PO BID and continue as directed by your cardiologist.  Rina Finnegan: 10 Tablet, Refills: 0  Start date: 1/19/2023, End date: 1/24/2023      !! apixaban (ELIQUIS) 5 mg tablet Take 1 Tablet by mouth two (2) times a day for 30 days. Please take Eliquis 2.5 mg PO BID for 5 days post operative (1/19/23 through 1/23/22).   On 1/24/23, please resume Eliquis 5 mg PO BID and continue as directed by your cardiologist.  Rina Finnegan: 60 Tablet, Refills: 0  Start date: 1/24/2023, End date: 2/23/2023      acetaminophen (TYLENOL) 500 mg tablet Take 1 Tablet by mouth every four (4) hours (while awake). Qty: 100 Tablet, Refills: 0  Start date: 1/19/2023      senna-docusate (PERICOLACE) 8.6-50 mg per tablet Take 1 Tablet by mouth two (2) times a day for 30 days. Qty: 60 Tablet, Refills: 0  Start date: 1/19/2023, End date: 2/18/2023       !! - Potential duplicate medications found. Please discuss with provider. CONTINUE these medications which have NOT CHANGED    Details   metoprolol succinate (TOPROL-XL) 50 mg XL tablet Take 50 mg by mouth daily. isosorbide mononitrate ER (IMDUR) 30 mg tablet Take 30 mg by mouth every morning. polyethylene glycol (MIRALAX) 17 gram/dose powder Take 17 g by mouth daily. cholecalciferol, vitamin D3, (VITAMIN D3 PO) Take 1 Tablet by mouth daily. multivitamin (ONE A DAY) tablet Take 1 Tablet by mouth every evening. melatonin 5 mg tablet Take 5 mg by mouth nightly. famotidine (PEPCID) 40 mg tablet Take 40 mg by mouth every evening. pravastatin (PRAVACHOL) 40 mg tablet Take 40 mg by mouth nightly. furosemide (LASIX) 40 mg tablet Take 40 mg by mouth daily.  Indications: SWELLING IN ANKLES; LEFT SWELLS MORE FOLLOWING FRACTURES           STOP taking these medications       warfarin (COUMADIN) 5 mg tablet Comments:   Reason for Stopping:            per medical continuation form      -Follow up in office in 3-4 weeks with Dr. Amy Agudelo      Signed:  Yuliana Carvajal NP  Orthopaedic Nurse Practitioner    1/20/2023  1:47 PM

## 2023-01-20 NOTE — PROGRESS NOTES
Bedside and Verbal shift change report given to Chava Ruiz RN (oncoming nurse) by Samson Auguste RN (offgoing nurse). Report included the following information SBAR, Kardex, Procedure Summary, Intake/Output, MAR, Accordion, and Recent Results.

## 2023-01-20 NOTE — PROGRESS NOTES
RUR 9 %     Transition of Care- Discharge today to [facility] via [facility] at [address] not available until then. Report # [phone] room 715, Rigoberto Yanes RN aware of the discharge time. Discharge folder located on the hard chart.      SHAHAB López

## 2023-01-20 NOTE — PROGRESS NOTES
Ortho NP Note    POD# 2  s/p REVISION OF LEFT TOTAL KNEE ARTHROPLASTY   Pt seen without visitor present    Pt resting in bed. Reports pain this morning is 3/10, denies need for medication at this time. Reports she had some confusion this morning when first waking up but good insight into event. Aware of plan for discharge to [facility] today and is agreeable. Denies N/V. No CP, no SOB. No dizziness, lightheadedness. Denies urge to void at this time. No lower abd pressure, discomfort. Visit Vitals  /89   Pulse (!) 102   Temp 98.1 °F (36.7 °C)   Resp 18   Wt 92.8 kg (204 lb 9.4 oz)   SpO2 96% Comment: range from 90-96   BMI 35.12 kg/m²       Voiding status: spontaneous void today but straight cath overnight. Output (mL)  Urine Voided: 300 ml (01/19/23 1400)  Straight Cath  Straight Cath: Nurse performed cath;Sterile technique used (01/20/23 0424)  Number of Attempts: 1 (01/20/23 0424)  Catheter Size: 14 FR (01/20/23 0424)  Time Catheter Inserted: 7877 (01/20/23 0424)  Time Catheter Removed: 3789 (01/20/23 0424)  Urine: 675 mL (01/20/23 0424)      Labs    Lab Results   Component Value Date/Time    HGB 11.2 (L) 01/19/2023 04:23 AM      Lab Results   Component Value Date/Time    INR 2.6 (H) 12/08/2022 02:21 PM    INR (POC) 2.0 (H) 12/21/2022 12:48 PM      Lab Results   Component Value Date/Time    Sodium 139 01/19/2023 04:23 AM    Potassium 4.0 01/19/2023 04:23 AM    Chloride 109 (H) 01/19/2023 04:23 AM    CO2 24 01/19/2023 04:23 AM    Glucose 115 (H) 01/19/2023 04:23 AM    BUN 17 01/19/2023 04:23 AM    Creatinine 1.10 (H) 01/19/2023 04:23 AM    Calcium 8.9 01/19/2023 04:23 AM     Recent Glucose Results: No results found for: GLU, GLUPOC, GLUCPOC        Aquacel dressing to left knee c.d.i  Cryotherapy in place over incision  Calves soft and supple; No pain with passive stretch  Bilateral LEs warm, dry. 2+ DP pulses.     Sensation and motor intact - PF/DF/EHL intact 5/5  Foot Pumps for mechanical DVT proph while in bed     ASSESSMENT/PLAN: Patient seen following therapy clearance. I have reviewed progress note and am in agreement with assessment and plan as documented by Dr Uziel Gonzales. Interval updates and discharge preparation as follows:      1) PT: BID WBAT in hospital.  Therapy to be continued at [facility]  2) Anticoagulation:  Eliquis 2.5 mg PO BID for DVT Prophylaxis x 5 days post op. Then resume home dose Eliquis 5 mg PO BID. Encouraged early mobilization, bed exercises, and SCD use. 3) Pain - Multimodal approach including cryotherapy, scheduled Tylenol with  PRN  oxycodone  4) Post operative anemia: Hgb on 12/8: 13.5. EBL: 100 mL. Hgb on POD 1: 11.2. Hemodynamically stable, no active bleeding noted on exam.  Expected Acute blood loss post-op anemia, continue to monitor. 5) POUR: Follow POUR algorithm, bladder scan. If greater than 400 mL,diaz placement. Discussed with Morgan Martinez at [facility] patient's noted urinary retention, to be followed at discharge. 6) Post op care: Continue bowel regimen, encouraged IS. Follow up in 3-4 weeks with Dr Uziel Gonzales. Aquacel to remain in place x 7 days unless integrity is lost.   7) Readiness for discharge: CM involved for placement, referrals to be sent today. [x] Vital Signs stable    [x] + Voiding    [x] Wound intact, drainage minimal    [x] Tolerating PO intake     [x] Cleared by PT (OT if applicable) : to [facility]    [] Stair training completed (if applicable)    [] Independent / Contact Guard Assist (household distance)     [] Bed mobility     [] Car transfers     [] ADLs    [x] Adequate pain control on oral medication alone     Discharge at 5 PM to [facility] for ongoing rehab.     Bhavana Ricci NP  Available via [employer]

## 2023-01-20 NOTE — PROGRESS NOTES
Problem: Falls - Risk of  Goal: *Absence of Falls  Description: Document Chicas Pancoast Fall Risk and appropriate interventions in the flowsheet. Outcome: Progressing Towards Goal  Note: Fall Risk Interventions:                                Problem: Knee Replacement: Post-Op Day 1  Goal: Activity/Safety  Outcome: Progressing Towards Goal  Goal: Diagnostic Test/Procedures  Outcome: Progressing Towards Goal  Goal: Nutrition/Diet  Outcome: Progressing Towards Goal  Goal: Medications  Outcome: Progressing Towards Goal  Goal: Respiratory  Outcome: Progressing Towards Goal  Goal: Treatments/Interventions/Procedures  Outcome: Progressing Towards Goal  Goal: Psychosocial  Outcome: Progressing Towards Goal  Goal: Discharge Planning  Outcome: Progressing Towards Goal  Goal: *Demonstrates progressive activity  Outcome: Progressing Towards Goal  Goal: *Optimal pain control at patient's stated goal  Outcome: Progressing Towards Goal  Goal: *Hemodynamically stable  Outcome: Progressing Towards Goal  Goal: *Discharge plan identified  Outcome: Progressing Towards Goal     Problem: Pressure Injury - Risk of  Goal: *Prevention of pressure injury  Description: Document John Scale and appropriate interventions in the flowsheet.   Outcome: Progressing Towards Goal  Note: Pressure Injury Interventions:

## 2023-01-21 LAB
BACTERIA SPEC CULT: ABNORMAL
GRAM STN SPEC: ABNORMAL
GRAM STN SPEC: ABNORMAL
SERVICE CMNT-IMP: ABNORMAL

## 2023-01-21 NOTE — PROGRESS NOTES
Lab called with results of gram positive cocci in bottle  of broth growing. Acacia VALLADARES ,who spoke with  and pt was cleared for D/C and she was picked up by wheelchair Jaimee Donohue

## 2023-01-23 LAB
BACTERIA SPEC CULT: NORMAL
SERVICE CMNT-IMP: NORMAL

## 2023-02-15 ENCOUNTER — OFFICE VISIT (OUTPATIENT)
Dept: ORTHOPEDIC SURGERY | Age: 88
End: 2023-02-15
Payer: MEDICARE

## 2023-02-15 VITALS — HEIGHT: 64 IN | BODY MASS INDEX: 35.12 KG/M2

## 2023-02-15 DIAGNOSIS — Z96.652 STATUS POST REVISION OF TOTAL REPLACEMENT OF LEFT KNEE: Primary | ICD-10-CM

## 2023-02-15 DIAGNOSIS — Z09 SURGERY FOLLOW-UP: ICD-10-CM

## 2023-02-15 PROCEDURE — 99024 POSTOP FOLLOW-UP VISIT: CPT | Performed by: PHYSICIAN ASSISTANT

## 2023-02-15 NOTE — PROGRESS NOTES
Audi Gasca (: 1935) is a 82 y.o. female, patient, here for evaluation of the following chief complaint(s):  Surgical Follow-up       SUBJECTIVE/OBJECTIVE:  Audi Gasca presents today for first postop visit status post revision left TKA on 23. After surgery, she went to rehab for short time because she had no one to stay with her at home. She has since discharged home. She has home physical therapy coming to her. She is doing well, taking occasional Tylenol, and half an oxycodone once in a while. She tries to avoid medication because it makes her drowsy. She is on her preop dose of Eliquis already. PHYSICAL EXAM:  Vitals: Ht 5' 4\" (1.626 m)   BMI 35.12 kg/m²   Body mass index is 35.12 kg/m². 82y.o. year old F in no acute distress. Ambulates with a slight limp at start up, walker for assistance. Neutral alignment of the left knee. Trace residual effusion. Well approximated, well-healing anterior incision with some eschar remaining, no marginal erythema, warmth, drainage. Range of motion lacks few degrees of full extension with flexion to 90/95. Preop 0-1 15. Motor 5/5. No distal edema. IMAGING:  Radiographs: XR Results (most recent):  Results from Appointment encounter on 02/15/23    XR KNEE LT 3 V    Narrative  3 views of the left knee today including AP, lateral and sunrise using digital radiography demonstrate satisfactory position and alignment of cemented revision total knee components. No evidence of loosening. Patella tracks centrally. ASSESSMENT/PLAN:  1. Status post revision of total replacement of left knee  -     XR KNEE LT 3 V; Future  -     REFERRAL TO PHYSICAL THERAPY  2. Surgery follow-up    First postop visit status post revision left total knee arthroplasty on 2023. Continue with home physical therapy for another week or 2, then transition outpatient PT. She also plans to go back to the [facility] once outpatient therapy is underway.   Continue Tylenol as needed, wean from oxycodone. Continue preop dose of Eliquis. Follow-up in 4 weeks for clinical recheck, sooner if needed. Return for POV #2 with Dr. Denise Araujo. Review Of Systems     Patient denies any recent fever, chills, nausea, vomiting, chest pain, or shortness of breath. Allergies   Allergen Reactions    Codeine Nausea and Vomiting    Demerol [Meperidine] Palpitations and Other (comments)     \"made me hyper\"    Morphine Other (comments)     \"disoriented\"    Penicillin G Hives and Swelling     EYE SWELLING  Patient screened for any delayed non-IgE-mediated reaction to PCN. Patient notes the following:    No delayed non-IgE-mediated reaction to PCN                Current Outpatient Medications   Medication Sig    metoprolol succinate (TOPROL-XL) 50 mg XL tablet Take 50 mg by mouth daily. apixaban (ELIQUIS) 5 mg tablet Take 1 Tablet by mouth two (2) times a day for 30 days. Please take Eliquis 2.5 mg PO BID for 5 days post operative (1/19/23 through 1/23/22). On 1/24/23, please resume Eliquis 5 mg PO BID and continue as directed by your cardiologist.    acetaminophen (TYLENOL) 500 mg tablet Take 1 Tablet by mouth every four (4) hours (while awake). senna-docusate (PERICOLACE) 8.6-50 mg per tablet Take 1 Tablet by mouth two (2) times a day for 30 days. isosorbide mononitrate ER (IMDUR) 30 mg tablet Take 30 mg by mouth every morning. polyethylene glycol (MIRALAX) 17 gram/dose powder Take 17 g by mouth daily. cholecalciferol, vitamin D3, (VITAMIN D3 PO) Take 1 Tablet by mouth daily. multivitamin (ONE A DAY) tablet Take 1 Tablet by mouth every evening. melatonin 5 mg tablet Take 5 mg by mouth nightly. famotidine (PEPCID) 40 mg tablet Take 40 mg by mouth every evening. pravastatin (PRAVACHOL) 40 mg tablet Take 40 mg by mouth nightly. furosemide (LASIX) 40 mg tablet Take 40 mg by mouth daily.  Indications: SWELLING IN ANKLES; LEFT SWELLS MORE FOLLOWING FRACTURES No current facility-administered medications for this visit.        Past Medical History:   Diagnosis Date    Adverse effect of anesthesia     \"lost\" 4 days on Morphine-BACK SURGERY    Arrhythmia 12/28/2011    DR Henrique Vasquez DCd DILTIAZEM DT NO ISSUES 2014, PT STATES SHE WENT INTO AFIB DURING RIGHT KNEE SURGERY IN 2014    Arthritis     Chronic pain     BOTH KNEES    GERD (gastroesophageal reflux disease)     CONTROLLED WITH OMEPRAZOLE    Hypertension     Ill-defined condition     constipation    Sebaceous cyst 04/14/2014    Snores         Past Surgical History:   Procedure Laterality Date    HX APPENDECTOMY  1953    HX BREAST BIOPSY  2008    HX BREAST REDUCTION  2006    HX CATARACT REMOVAL Bilateral     HX CERVICAL FUSION  1981    HX COLONOSCOPY      HX CYST INCISION AND DRAINAGE Left 11/15/2018    excision of sebaceous cyst left shoulder     HX DILATION AND CURETTAGE  1973    HX GI      appendectomy    HX GI  1975    hemorrhoidectomy    HX HEENT      neck surgery cervical discectomy    HX HERNIA REPAIR  6393    UMBILICAL    HX HYSTERECTOMY  1976    VAGINAL    HX KNEE REPLACEMENT Left 08/11/2016    HX KNEE REPLACEMENT Left 01/18/2023    revision    HX MOHS PROCEDURES  2005    right shoulder    HX ORTHOPAEDIC  1991    left leg/ankle fx repair    HX ORTHOPAEDIC  2004    right BUNIONECTOMY    HX ORTHOPAEDIC  2009    RIGHT HAMMERTOE    HX ORTHOPAEDIC  2015    R TOTAL KNEE    HX ORTHOPAEDIC  2012    back fusion    HX OTHER SURGICAL  05/15/2013    surgery on neck glands/Parathyroid removed    HX OTHER SURGICAL  04/14/2014    cyst removed from back    HX PARATHYROIDECTOMY  2013    HX UROLOGICAL  2012    lithotrypsy , 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        Family History   Problem Relation Age of Onset    Other Mother         BROKE BACK    Emphysema Father     Diabetes Sister     OSTEOARTHRITIS Sister     OSTEOARTHRITIS Sister     Dementia Sister     Cancer Daughter         LUNG CANCER WITH METS TO SPINE    No Known Problems Son     Anesth Problems Neg Hx         Social History     Socioeconomic History    Marital status:      Spouse name: Not on file    Number of children: Not on file    Years of education: Not on file    Highest education level: Not on file   Occupational History    Not on file   Tobacco Use    Smoking status: Never    Smokeless tobacco: Never   Vaping Use    Vaping Use: Never used   Substance and Sexual Activity    Alcohol use: No    Drug use: No    Sexual activity: Not on file   Other Topics Concern    Not on file   Social History Narrative    Not on file     Social Determinants of Health     Financial Resource Strain: Not on file   Food Insecurity: Not on file   Transportation Needs: Not on file   Physical Activity: Not on file   Stress: Not on file   Social Connections: Not on file   Intimate Partner Violence: Not on file   Housing Stability: Not on file         Orders Placed This Encounter    XR KNEE LT 3 V     4a     Standing Status:   Future     Number of Occurrences:   1     Standing Expiration Date:   2/16/2024    REFERRAL TO PHYSICAL THERAPY     Referral Priority:   Routine     Referral Type:   PT/OT/ST     Referral Reason:   Specialty Services Required     Number of Visits Requested:   [address] Manav Soto M.D. was available for immediate consultation as the supervising physician. An electronic signature was used to authenticate this note.   -- Chanda Arias PA-C

## 2023-02-15 NOTE — LETTER
2/15/2023    Patient: Mayra Varma   YOB: 1935   Date of Visit: 2/15/2023     Elisa Hernández MD  [address] [address]  Via Fax: [phone]    Dear Elisa Hernández MD,      Thank you for referring Ms. Lieutenant Orourke to [facility] for evaluation. My notes for this consultation are attached. If you have questions, please do not hesitate to call me. I look forward to following your patient along with you.       Sincerely,    Sayra Bajwa PA-C

## 2023-03-23 ENCOUNTER — OFFICE VISIT (OUTPATIENT)
Dept: ORTHOPEDIC SURGERY | Age: 88
End: 2023-03-23
Payer: MEDICARE

## 2023-03-23 VITALS — BODY MASS INDEX: 34.83 KG/M2 | HEIGHT: 64 IN | WEIGHT: 204 LBS

## 2023-03-23 DIAGNOSIS — Z09 SURGERY FOLLOW-UP: ICD-10-CM

## 2023-03-23 DIAGNOSIS — Z96.652 STATUS POST REVISION OF TOTAL REPLACEMENT OF LEFT KNEE: Primary | ICD-10-CM

## 2023-03-23 PROCEDURE — 99024 POSTOP FOLLOW-UP VISIT: CPT | Performed by: ORTHOPAEDIC SURGERY

## 2023-03-23 NOTE — PROGRESS NOTES
Magan Romero (: 1935) is a 82 y.o. female, patient, here for evaluation of the following chief complaint(s):  Surgical Follow-up (PO #2: Revision LTK: 23)       SUBJECTIVE/OBJECTIVE:  Magan Romero presents today approximately 8 weeks out from revision left total knee replacement. Overall she is happy with her progress. Light significant improvement in knee range of motion and overall mobility. Has mild discomfort with activity. Continues with outpatient physical therapy. PHYSICAL EXAM:  Vitals: Ht 5' 4\" (1.626 m)   Wt 204 lb (92.5 kg)   BMI 35.02 kg/m²   Body mass index is 35.02 kg/m². 82y.o. year old F, no distress. Ambulates with trace limp. Left anterior knee incision is well-healed. Trace residual local knee swelling and warmth. Lacks a few degrees of active extension, full passive extension. Flexion to approximately 110 degrees. Preoperative motion 0-115. Knee is stable throughout arc of motion. 1+ distal edema, which she states resolves overnight while sleeping in bed. No calf tenderness      IMAGING:  Radiographs: No images today    ASSESSMENT/PLAN:  1. Status post revision of total replacement of left knee  2. Surgery follow-up    Satisfactory progress. Continue outpatient therapy for few more weeks before transitioning to home exercise program.  Activities as tolerated. Return in 10 months for routine 1 year postop x-ray left knee, sooner if needed       No follow-ups on file. Review Of Systems  ROS    Positive for: Musculoskeletal  Last edited by Venkat Hilton on 3/23/2023  1:47 PM.         Patient denies any recent fever, chills, nausea, vomiting, chest pain, or shortness of breath.     Allergies   Allergen Reactions    Codeine Nausea and Vomiting    Demerol [Meperidine] Palpitations and Other (comments)     \"made me hyper\"    Morphine Other (comments)     \"disoriented\"    Penicillin G Hives and Swelling     EYE SWELLING  Patient screened for any delayed non-IgE-mediated reaction to PCN. Patient notes the following:    No delayed non-IgE-mediated reaction to PCN                Current Outpatient Medications   Medication Sig    metoprolol succinate (TOPROL-XL) 50 mg XL tablet Take 50 mg by mouth daily. acetaminophen (TYLENOL) 500 mg tablet Take 1 Tablet by mouth every four (4) hours (while awake). (Patient taking differently: Take 500 mg by mouth as needed.)    isosorbide mononitrate ER (IMDUR) 30 mg tablet Take 30 mg by mouth every morning. polyethylene glycol (MIRALAX) 17 gram/dose powder Take 17 g by mouth daily. cholecalciferol, vitamin D3, (VITAMIN D3 PO) Take 1 Tablet by mouth daily. multivitamin (ONE A DAY) tablet Take 1 Tablet by mouth every evening. melatonin 5 mg tablet Take 5 mg by mouth nightly. famotidine (PEPCID) 40 mg tablet Take 40 mg by mouth every evening. pravastatin (PRAVACHOL) 40 mg tablet Take 40 mg by mouth nightly. furosemide (LASIX) 40 mg tablet Take 40 mg by mouth daily. Indications: SWELLING IN ANKLES; LEFT SWELLS MORE FOLLOWING FRACTURES     No current facility-administered medications for this visit.        Past Medical History:   Diagnosis Date    Adverse effect of anesthesia     \"lost\" 4 days on Morphine-BACK SURGERY    Arrhythmia 12/28/2011    DR Nicolette Smith DCd DILTIAZEM DT NO ISSUES 2014, PT STATES SHE WENT INTO AFIB DURING RIGHT KNEE SURGERY IN 2014    Arthritis     Chronic pain     BOTH KNEES    GERD (gastroesophageal reflux disease)     CONTROLLED WITH OMEPRAZOLE    Hypertension     Ill-defined condition     constipation    Sebaceous cyst 04/14/2014    Snores         Past Surgical History:   Procedure Laterality Date    HX APPENDECTOMY  1953    HX BREAST BIOPSY  2008    HX BREAST REDUCTION  2006    HX CATARACT REMOVAL Bilateral     HX CERVICAL FUSION  1981    HX COLONOSCOPY      HX CYST INCISION AND DRAINAGE Left 11/15/2018    excision of sebaceous cyst left shoulder     HX DILATION AND CURETTAGE  1973    HX GI      appendectomy    HX GI  1975    hemorrhoidectomy    HX HEENT      neck surgery cervical discectomy    HX HERNIA REPAIR  9571    UMBILICAL    HX HYSTERECTOMY  1976    VAGINAL    HX KNEE REPLACEMENT Left 08/11/2016    HX KNEE REPLACEMENT Left 01/18/2023    revision    HX MOHS PROCEDURES  2005    right shoulder    HX ORTHOPAEDIC  1991    left leg/ankle fx repair    HX ORTHOPAEDIC  2004    right BUNIONECTOMY    HX ORTHOPAEDIC  2009    RIGHT HAMMERTOE    HX ORTHOPAEDIC  2015    R TOTAL KNEE    HX ORTHOPAEDIC  2012    back fusion    HX OTHER SURGICAL  05/15/2013    surgery on neck glands/Parathyroid removed    HX OTHER SURGICAL  04/14/2014    cyst removed from back    HX PARATHYROIDECTOMY  2013    HX UROLOGICAL  2012    lithotrypsy , 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        Family History   Problem Relation Age of Onset    Other Mother         BROKE BACK    Emphysema Father     Diabetes Sister     OSTEOARTHRITIS Sister     OSTEOARTHRITIS Sister     Dementia Sister     Cancer Daughter         LUNG CANCER WITH METS TO SPINE    No Known Problems Son     Anesth Problems Neg Hx         Social History     Socioeconomic History    Marital status:      Spouse name: Not on file    Number of children: Not on file    Years of education: Not on file    Highest education level: Not on file   Occupational History    Not on file   Tobacco Use    Smoking status: Never    Smokeless tobacco: Never   Vaping Use    Vaping Use: Never used   Substance and Sexual Activity    Alcohol use: No    Drug use: No    Sexual activity: Not on file   Other Topics Concern    Not on file   Social History Narrative    Not on file     Social Determinants of Health     Financial Resource Strain: Not on file   Food Insecurity: Not on file   Transportation Needs: Not on file   Physical Activity: Not on file   Stress: Not on file   Social Connections: Not on file   Intimate Partner Violence: Not on file   Housing Stability: Not on file       No orders of the defined types were placed in this encounter. An electronic signature was used to authenticate this note.   -- Kathy Miller MD

## 2023-04-22 DIAGNOSIS — Z12.31 VISIT FOR SCREENING MAMMOGRAM: Primary | ICD-10-CM

## 2023-04-22 DIAGNOSIS — Z12.31 ENCOUNTER FOR SCREENING MAMMOGRAM FOR MALIGNANT NEOPLASM OF BREAST: Primary | ICD-10-CM

## 2023-05-01 ENCOUNTER — OFFICE VISIT (OUTPATIENT)
Dept: ORTHOPEDIC SURGERY | Age: 88
End: 2023-05-01

## 2023-05-01 VITALS — BODY MASS INDEX: 34.83 KG/M2 | WEIGHT: 204 LBS | HEIGHT: 64 IN

## 2023-05-01 DIAGNOSIS — M75.112 NONTRAUMATIC INCOMPLETE TEAR OF LEFT ROTATOR CUFF: ICD-10-CM

## 2023-05-01 DIAGNOSIS — M12.811 ROTATOR CUFF ARTHROPATHY, RIGHT: ICD-10-CM

## 2023-05-01 DIAGNOSIS — M25.512 CHRONIC LEFT SHOULDER PAIN: ICD-10-CM

## 2023-05-01 DIAGNOSIS — G89.29 CHRONIC RIGHT SHOULDER PAIN: Primary | ICD-10-CM

## 2023-05-01 DIAGNOSIS — G89.29 CHRONIC LEFT SHOULDER PAIN: ICD-10-CM

## 2023-05-01 DIAGNOSIS — M25.511 CHRONIC RIGHT SHOULDER PAIN: Primary | ICD-10-CM

## 2023-05-03 RX ORDER — BUPIVACAINE HYDROCHLORIDE 2.5 MG/ML
4 INJECTION, SOLUTION INFILTRATION; PERINEURAL ONCE
Status: COMPLETED | OUTPATIENT
Start: 2023-05-03 | End: 2023-05-03

## 2023-05-03 RX ORDER — TRIAMCINOLONE ACETONIDE 40 MG/ML
80 INJECTION, SUSPENSION INTRA-ARTICULAR; INTRAMUSCULAR ONCE
Status: COMPLETED | OUTPATIENT
Start: 2023-05-03 | End: 2023-05-03

## 2023-05-03 RX ADMIN — BUPIVACAINE HYDROCHLORIDE 10 MG: 2.5 INJECTION, SOLUTION INFILTRATION; PERINEURAL at 09:20

## 2023-05-03 RX ADMIN — TRIAMCINOLONE ACETONIDE 80 MG: 40 INJECTION, SUSPENSION INTRA-ARTICULAR; INTRAMUSCULAR at 09:20

## 2023-06-19 ENCOUNTER — HOSPITAL ENCOUNTER (OUTPATIENT)
Facility: HOSPITAL | Age: 88
Discharge: HOME | End: 2023-06-22
Attending: FAMILY MEDICINE
Payer: MEDICARE

## 2023-06-19 DIAGNOSIS — Z12.31 VISIT FOR SCREENING MAMMOGRAM: ICD-10-CM

## 2023-06-19 PROCEDURE — 77063 BREAST TOMOSYNTHESIS BI: CPT

## 2023-11-03 ENCOUNTER — HOSPITAL ENCOUNTER (EMERGENCY)
Facility: HOSPITAL | Age: 88
Discharge: HOME | End: 2023-11-03
Attending: EMERGENCY MEDICINE
Payer: MEDICARE

## 2023-11-03 VITALS
WEIGHT: 197.8 LBS | RESPIRATION RATE: 19 BRPM | DIASTOLIC BLOOD PRESSURE: 92 MMHG | BODY MASS INDEX: 33.77 KG/M2 | HEART RATE: 98 BPM | OXYGEN SATURATION: 99 % | SYSTOLIC BLOOD PRESSURE: 131 MMHG | HEIGHT: 64 IN | TEMPERATURE: 98.2 F

## 2023-11-03 DIAGNOSIS — R04.0 EPISTAXIS: Primary | ICD-10-CM

## 2023-11-03 PROCEDURE — 6370000000 HC RX 637 (ALT 250 FOR IP): Performed by: EMERGENCY MEDICINE

## 2023-11-03 PROCEDURE — 99282 EMERGENCY DEPT VISIT SF MDM: CPT

## 2023-11-03 RX ORDER — OXYMETAZOLINE HYDROCHLORIDE 0.05 G/100ML
2 SPRAY NASAL
Status: COMPLETED | OUTPATIENT
Start: 2023-11-03 | End: 2023-11-03

## 2023-11-03 RX ADMIN — OXYMETAZOLINE HYDROCHLORIDE 2 SPRAY: 0.05 SPRAY NASAL at 11:37

## 2023-11-03 ASSESSMENT — PAIN - FUNCTIONAL ASSESSMENT: PAIN_FUNCTIONAL_ASSESSMENT: NONE - DENIES PAIN

## 2023-11-03 NOTE — ED PROVIDER NOTES
[facility] EMERGENCY DEPT  EMERGENCY DEPARTMENT HISTORY AND PHYSICAL EXAM      Date: 11/3/2023  Patient Name: Enrique Ojeda  MRN: [id number]  [address] [facility] 1935  Date of evaluation: 11/3/2023  Provider: Ethan Dallas MD   Note Started: 2:38 PM EDT 11/3/23    HISTORY OF PRESENT ILLNESS     Chief Complaint   Patient presents with    Epistaxis       History Provided By: Patient    HPI: Enrique Ojeda is a 82 y.o. female on Eliquis due to chronic A-fib presents to the emergency department right-sided epistaxis that started around 930 this morning. Applied pressure without improvement. Spoke with her cardiologist office he held dose of her Eliquis (had taken dose this morning) and advised she come to the ER if symptoms persisted. She was noted to have bleeding despite holding pressure so came to the emergency department. On arrival pressure applied and held for 30 minutes on evaluation there is a small amount of blood without an obvious source.   Oxymetazoline was applied intranasally and clamp reapplied on repeat evaluation bleeding appears to be controlled    PAST MEDICAL HISTORY   Past Medical History:  Past Medical History:   Diagnosis Date    Adverse effect of anesthesia     \"lost\" 4 days on Morphine-BACK SURGERY    Arrhythmia 12/28/2011    DR Opal Church DCd DILTIAZEM DT NO ISSUES 2014, PT STATES SHE WENT INTO AFIB DURING RIGHT KNEE SURGERY IN 2014    Arthritis     Chronic pain     BOTH KNEES    GERD (gastroesophageal reflux disease)     CONTROLLED WITH OMEPRAZOLE    Hypertension     Ill-defined condition     constipation    Sebaceous cyst 04/14/2014    Snores        Past Surgical History:  Past Surgical History:   Procedure Laterality Date    APPENDECTOMY  1953    BREAST BIOPSY  2008    BREAST REDUCTION SURGERY  2006    CATARACT REMOVAL Bilateral     CERVICAL FUSION  1981    COLONOSCOPY      CYST INCISION AND DRAINAGE Left 11/15/2018    excision of sebaceous cyst left shoulder     DILATION AND CURETTAGE OF tablet Take by mouth      famotidine (PEPCID) 40 MG tablet Take by mouth every evening      furosemide (LASIX) 40 MG tablet Take by mouth daily      isosorbide mononitrate (IMDUR) 30 MG extended release tablet Take by mouth      melatonin 5 MG TABS tablet Take by mouth      metoprolol succinate (TOPROL XL) 50 MG extended release tablet Take by mouth daily      polyethylene glycol (GLYCOLAX) 17 GM/SCOOP powder Take by mouth daily      pravastatin (PRAVACHOL) 40 MG tablet Take by mouth         Social Determinants of Health:   Social Determinants of Health     Tobacco Use: Low Risk  (11/3/2023)    Patient History     Smoking Tobacco Use: Never     Smokeless Tobacco Use: Never     Passive Exposure: Not on file   Alcohol Use: Not on file   Financial Resource Strain: Not on file   Food Insecurity: Not on file   Transportation Needs: Not on file   Physical Activity: Not on file   Stress: Not on file   Social Connections: Not on file   Intimate Partner Violence: Not on file   Depression: Not on file   Housing Stability: Not on file   Interpersonal Safety: Not on file   Utilities: Not on file       PHYSICAL EXAM   Physical Exam  Vitals and nursing note reviewed. Constitutional:       Appearance: Normal appearance. She is not ill-appearing or toxic-appearing. HENT:      Head: Normocephalic. Right Ear: External ear normal.      Left Ear: External ear normal.      Nose:      Comments: Blood noted right naris no obvious source. No active bleeding noted. Patient feels as though there is blood draining down the back of her throat     Mouth/Throat:      Mouth: Mucous membranes are moist.   Eyes:      Pupils: Pupils are equal, round, and reactive to light. Neck:      Vascular: No carotid bruit. Cardiovascular:      Rate and Rhythm: Normal rate. Pulses: Normal pulses. Pulmonary:      Effort: Pulmonary effort is normal.   Abdominal:      General: Abdomen is flat.    Musculoskeletal:      Cervical back: Normal

## 2023-11-03 NOTE — ED TRIAGE NOTES
Patient reports nose bleed that started shortly before 10 am this morning patient is still on eliquis due to cardiologist not wanting to decrease dose for afib bleeding from right nose at constant trickle even after pressure applied

## 2023-12-12 ENCOUNTER — OFFICE VISIT (OUTPATIENT)
Age: 88
End: 2023-12-12
Payer: MEDICARE

## 2023-12-12 VITALS
HEIGHT: 64 IN | DIASTOLIC BLOOD PRESSURE: 92 MMHG | HEART RATE: 75 BPM | SYSTOLIC BLOOD PRESSURE: 126 MMHG | WEIGHT: 197 LBS | OXYGEN SATURATION: 98 % | BODY MASS INDEX: 33.63 KG/M2

## 2023-12-12 DIAGNOSIS — J34.89 NASAL DRYNESS: ICD-10-CM

## 2023-12-12 DIAGNOSIS — R04.0 EPISTAXIS: Primary | ICD-10-CM

## 2023-12-12 DIAGNOSIS — Z79.01 ANTICOAGULATED: ICD-10-CM

## 2023-12-12 PROCEDURE — 99203 OFFICE O/P NEW LOW 30 MIN: CPT | Performed by: OTOLARYNGOLOGY

## 2023-12-12 PROCEDURE — G8427 DOCREV CUR MEDS BY ELIG CLIN: HCPCS | Performed by: OTOLARYNGOLOGY

## 2023-12-12 PROCEDURE — 1090F PRES/ABSN URINE INCON ASSESS: CPT | Performed by: OTOLARYNGOLOGY

## 2023-12-12 PROCEDURE — G8484 FLU IMMUNIZE NO ADMIN: HCPCS | Performed by: OTOLARYNGOLOGY

## 2023-12-12 PROCEDURE — 30901 CONTROL OF NOSEBLEED: CPT | Performed by: OTOLARYNGOLOGY

## 2023-12-12 PROCEDURE — 1123F ACP DISCUSS/DSCN MKR DOCD: CPT | Performed by: OTOLARYNGOLOGY

## 2023-12-12 PROCEDURE — G8417 CALC BMI ABV UP PARAM F/U: HCPCS | Performed by: OTOLARYNGOLOGY

## 2023-12-12 PROCEDURE — 1036F TOBACCO NON-USER: CPT | Performed by: OTOLARYNGOLOGY

## 2023-12-12 NOTE — PROGRESS NOTES
Epistaxis  Anticoagulation  Nasal mucosa dry  -Cauterized as above  -Discussed epistaxis precautions  -Avoid nasal manipulation  -Use as needed nasal moisture  -Follow-up in 1 month for recheck  -Discussed management of epistaxis should episodes recur        The patient was instructed to return to clinic if no improvement or progression of symptoms. Signs to watch out for reviewed.       Lara Wilde MD   [address] [place] [address] ENT & Allergy  [address] [place] [address] [address] [place] [address]  Office Phone: [phone]

## 2023-12-13 ENCOUNTER — HOSPITAL ENCOUNTER (EMERGENCY)
Facility: HOSPITAL | Age: 88
Discharge: HOME | End: 2023-12-13
Attending: EMERGENCY MEDICINE
Payer: MEDICARE

## 2023-12-13 VITALS
OXYGEN SATURATION: 98 % | DIASTOLIC BLOOD PRESSURE: 87 MMHG | SYSTOLIC BLOOD PRESSURE: 133 MMHG | WEIGHT: 197 LBS | BODY MASS INDEX: 33.63 KG/M2 | TEMPERATURE: 97.4 F | HEIGHT: 64 IN | HEART RATE: 74 BPM | RESPIRATION RATE: 19 BRPM

## 2023-12-13 DIAGNOSIS — R04.0 EPISTAXIS: Primary | ICD-10-CM

## 2023-12-13 PROCEDURE — 6370000000 HC RX 637 (ALT 250 FOR IP): Performed by: EMERGENCY MEDICINE

## 2023-12-13 RX ORDER — OXYMETAZOLINE HYDROCHLORIDE 0.05 G/100ML
2 SPRAY NASAL ONCE
Status: COMPLETED | OUTPATIENT
Start: 2023-12-13 | End: 2023-12-13

## 2023-12-13 RX ADMIN — OXYMETAZOLINE HCL 2 SPRAY: 0.05 SPRAY NASAL at 19:23

## 2023-12-13 ASSESSMENT — PAIN SCALES - GENERAL: PAINLEVEL_OUTOF10: 0

## 2023-12-13 ASSESSMENT — PAIN - FUNCTIONAL ASSESSMENT: PAIN_FUNCTIONAL_ASSESSMENT: 0-10

## 2023-12-13 NOTE — ED TRIAGE NOTES
Pt reports epistaxis beginning 45 mins PTA with large clots. Pt is on eliquis, hx afib. Pt was seen yesterday for same at PCP and had nares cauterized. Blood noted to both nares. Pt is hypertensive in triage and states it was low at home.

## 2023-12-14 ENCOUNTER — TELEPHONE (OUTPATIENT)
Age: 88
End: 2023-12-14

## 2023-12-14 NOTE — TELEPHONE ENCOUNTER
Patient states was seen in the office on Tuesday. Had her nose cauterized. She had another nose bleed on yesterday and had to go to the ER to get it to stop. They did not pack it. They gave her a clamp and it stopped it from bleeding. Patient states that she is taking Eliquis. Was advised from the ER to call our office and let us know about the nose bleed. Wants to know what she should do.

## 2023-12-14 NOTE — ED NOTES
Pt was educated on and given discharge instructions, understanding was verbalized. Pt left ED with all belongings.         Stephanie Cortes RN  12/13/23 5212

## 2024-01-02 ENCOUNTER — HOSPITAL ENCOUNTER (EMERGENCY)
Facility: HOSPITAL | Age: 89
Discharge: HOME | End: 2024-01-02
Attending: EMERGENCY MEDICINE
Payer: MEDICARE

## 2024-01-02 VITALS
TEMPERATURE: 97.9 F | HEART RATE: 70 BPM | RESPIRATION RATE: 16 BRPM | SYSTOLIC BLOOD PRESSURE: 152 MMHG | HEIGHT: 64 IN | OXYGEN SATURATION: 98 % | DIASTOLIC BLOOD PRESSURE: 111 MMHG | BODY MASS INDEX: 33.81 KG/M2

## 2024-01-02 DIAGNOSIS — R04.0 EPISTAXIS: Primary | ICD-10-CM

## 2024-01-02 LAB
ALBUMIN SERPL-MCNC: 4 G/DL (ref 3.5–5)
ALBUMIN/GLOB SERPL: 1.1 (ref 1.1–2.2)
ALP SERPL-CCNC: 77 U/L (ref 45–117)
ALT SERPL-CCNC: 16 U/L (ref 12–78)
ANION GAP SERPL CALC-SCNC: 8 MMOL/L (ref 5–15)
AST SERPL W P-5'-P-CCNC: 17 U/L (ref 15–37)
BASOPHILS # BLD: 0 K/UL (ref 0–0.1)
BASOPHILS NFR BLD: 1 % (ref 0–1)
BILIRUB SERPL-MCNC: 0.9 MG/DL (ref 0.2–1)
BUN SERPL-MCNC: 25 MG/DL (ref 6–20)
BUN/CREAT SERPL: 18 (ref 12–20)
CA-I BLD-MCNC: 9.6 MG/DL (ref 8.5–10.1)
CHLORIDE SERPL-SCNC: 101 MMOL/L (ref 97–108)
CO2 SERPL-SCNC: 30 MMOL/L (ref 21–32)
CREAT SERPL-MCNC: 1.39 MG/DL (ref 0.55–1.02)
DIFFERENTIAL METHOD BLD: NORMAL
EOSINOPHIL # BLD: 0.1 K/UL (ref 0–0.4)
EOSINOPHIL NFR BLD: 2 % (ref 0–7)
ERYTHROCYTE [DISTWIDTH] IN BLOOD BY AUTOMATED COUNT: 13.1 % (ref 11.5–14.5)
GLOBULIN SER CALC-MCNC: 3.5 G/DL (ref 2–4)
GLUCOSE SERPL-MCNC: 113 MG/DL (ref 65–100)
HCT VFR BLD AUTO: 41.2 % (ref 35–47)
HGB BLD-MCNC: 13.5 G/DL (ref 11.5–16)
IMM GRANULOCYTES # BLD AUTO: 0 K/UL (ref 0–0.04)
IMM GRANULOCYTES NFR BLD AUTO: 0 % (ref 0–0.5)
INR PPP: 1.3 (ref 0.9–1.1)
LYMPHOCYTES # BLD: 1 K/UL (ref 0.8–3.5)
LYMPHOCYTES NFR BLD: 19 % (ref 12–49)
MCH RBC QN AUTO: 30.3 PG (ref 26–34)
MCHC RBC AUTO-ENTMCNC: 32.8 G/DL (ref 30–36.5)
MCV RBC AUTO: 92.6 FL (ref 80–99)
MONOCYTES # BLD: 0.5 K/UL (ref 0–1)
MONOCYTES NFR BLD: 9 % (ref 5–13)
NEUTS SEG # BLD: 3.4 K/UL (ref 1.8–8)
NEUTS SEG NFR BLD: 69 % (ref 32–75)
NRBC # BLD: 0 K/UL (ref 0–0.01)
NRBC BLD-RTO: 0 PER 100 WBC
PLATELET # BLD AUTO: 277 K/UL (ref 150–400)
PMV BLD AUTO: 9.6 FL (ref 8.9–12.9)
POTASSIUM SERPL-SCNC: 3.6 MMOL/L (ref 3.5–5.1)
PROT SERPL-MCNC: 7.5 G/DL (ref 6.4–8.2)
PROTHROMBIN TIME: 15.6 SEC (ref 11.9–14.6)
RBC # BLD AUTO: 4.45 M/UL (ref 3.8–5.2)
SODIUM SERPL-SCNC: 139 MMOL/L (ref 136–145)
WBC # BLD AUTO: 5 K/UL (ref 3.6–11)

## 2024-01-02 PROCEDURE — 85025 COMPLETE CBC W/AUTO DIFF WBC: CPT

## 2024-01-02 PROCEDURE — 80053 COMPREHEN METABOLIC PANEL: CPT

## 2024-01-02 PROCEDURE — 85610 PROTHROMBIN TIME: CPT

## 2024-01-02 PROCEDURE — 36415 COLL VENOUS BLD VENIPUNCTURE: CPT

## 2024-01-02 PROCEDURE — 99283 EMERGENCY DEPT VISIT LOW MDM: CPT

## 2024-01-02 ASSESSMENT — PAIN SCALES - GENERAL: PAINLEVEL_OUTOF10: 0

## 2024-01-02 ASSESSMENT — PAIN - FUNCTIONAL ASSESSMENT
PAIN_FUNCTIONAL_ASSESSMENT: 0-10
PAIN_FUNCTIONAL_ASSESSMENT: NONE - DENIES PAIN
PAIN_FUNCTIONAL_ASSESSMENT: NONE - DENIES PAIN

## 2024-01-02 NOTE — ED NOTES
Pt had 4 more squirts of home antihistamine in rt nare- scant amount of bleeding noted.   Pt has ambulated to bathroom without difficulty

## 2024-01-02 NOTE — ED PROVIDER NOTES
[facility] EMERGENCY DEPT  EMERGENCY DEPARTMENT HISTORY AND PHYSICAL EXAM      Date: 1/2/2024  Patient Name: Antonietta Lima  MRN: [id number]  YOB: 1935  Date of evaluation: 1/2/2024  Provider: Harini Guo MD   Note Started: 8:42 AM EST 1/2/24    HISTORY OF PRESENT ILLNESS     Chief Complaint   Patient presents with    Epistaxis       History Provided By: Patient    HPI: Antonietta Lima is a 88 y.o. female     PAST MEDICAL HISTORY   Past Medical History:  Past Medical History:   Diagnosis Date    Adverse effect of anesthesia     \"lost\" 4 days on Morphine-BACK SURGERY    Arrhythmia 12/28/2011    DR KNOWLES DCd DILTIAZEM DT NO ISSUES 2014, PT STATES SHE WENT INTO AFIB DURING RIGHT KNEE SURGERY IN 2014    Arthritis     Chronic pain     BOTH KNEES    GERD (gastroesophageal reflux disease)     CONTROLLED WITH OMEPRAZOLE    Hypertension     Ill-defined condition     constipation    Sebaceous cyst 04/14/2014    Snores        Past Surgical History:  Past Surgical History:   Procedure Laterality Date    APPENDECTOMY  1953    BREAST BIOPSY  2008    BREAST REDUCTION SURGERY  2006    CATARACT REMOVAL Bilateral     CERVICAL FUSION  1981    COLONOSCOPY      CYST INCISION AND DRAINAGE Left 11/15/2018    excision of sebaceous cyst left shoulder     DILATION AND CURETTAGE OF UTERUS  1973    GI  1975    hemorrhoidectomy    GI      appendectomy    HEENT      neck surgery cervical discectomy    HERNIA REPAIR  1974    UMBILICAL    HYSTERECTOMY (CERVIX STATUS UNKNOWN)  1976    VAGINAL    MOHS SURGERY  2005    right shoulder    ORTHOPEDIC SURGERY  1991    left leg/ankle fx repair    ORTHOPEDIC SURGERY  2004    right BUNIONECTOMY    ORTHOPEDIC SURGERY  2009    RIGHT HAMMERTOE    ORTHOPEDIC SURGERY  2015    R TOTAL KNEE    ORTHOPEDIC SURGERY  2012    back fusion    OTHER SURGICAL HISTORY  05/15/2013    surgery on neck glands/Parathyroid removed    OTHER SURGICAL HISTORY  04/14/2014    cyst removed from back     control, and Metabolic interventions  CASE REVIEW - Nursing and Family  TREATMENT RESPONSE -Improved, Stable, and Resolved  PERFORMED BY - Self    NOTES:  I have spent 32 minutes of critical care time involved in lab review, consultations with specialist, family decision- making, bedside attention and documentation. Time is exclusive of EKG interpretation, imaging interpretation and separately billed procedures.  During this entire length of time I was immediately available to the patient.  Harini Gou MD    FINAL IMPRESSION   No diagnosis found.      DISPOSITION/PLAN   DISPOSITION      Discharge Note: The patient is stable for discharge home. The signs, symptoms, diagnosis, and discharge instructions have been discussed, understanding conveyed, and agreed upon. The patient is to follow up as recommended or return to ER should their symptoms worsen.      PATIENT REFERRED TO:  No follow-up provider specified.      DISCHARGE MEDICATIONS:     Medication List        ASK your doctor about these medications      acetaminophen 500 MG tablet  Commonly known as: TYLENOL     famotidine 40 MG tablet  Commonly known as: PEPCID     furosemide 40 MG tablet  Commonly known as: LASIX     isosorbide mononitrate 30 MG extended release tablet  Commonly known as: IMDUR     melatonin 5 MG Tabs tablet     metoprolol succinate 50 MG extended release tablet  Commonly known as: TOPROL XL     polyethylene glycol 17 GM/SCOOP powder  Commonly known as: GLYCOLAX     pravastatin 40 MG tablet  Commonly known as: PRAVACHOL                DISCONTINUED MEDICATIONS:  Current Discharge Medication List          I am the Primary Clinician of Record: Harini Guo MD (electronically signed)    (Please note that parts of this dictation were completed with voice recognition software. Quite often unanticipated grammatical, syntax, homophones, and other interpretive errors are inadvertently transcribed by the computer software. Please

## 2024-01-02 NOTE — ED TRIAGE NOTES
Pt arrives with nose clamp in place. States nose has been bleeding for about an hour. Pt sees ENT for same, reports recent cauterization 2 or 3 weeks ago. Moderate bleeding noted to right nare. Clamp and gauze placed.

## 2024-01-09 ENCOUNTER — OFFICE VISIT (OUTPATIENT)
Age: 89
End: 2024-01-09
Payer: MEDICARE

## 2024-01-09 VITALS
WEIGHT: 195 LBS | DIASTOLIC BLOOD PRESSURE: 74 MMHG | HEART RATE: 59 BPM | SYSTOLIC BLOOD PRESSURE: 128 MMHG | BODY MASS INDEX: 30.61 KG/M2 | HEIGHT: 67 IN | RESPIRATION RATE: 16 BRPM | OXYGEN SATURATION: 98 %

## 2024-01-09 DIAGNOSIS — Z79.01 ANTICOAGULATED: ICD-10-CM

## 2024-01-09 DIAGNOSIS — J34.89 NASAL VESTIBULITIS: ICD-10-CM

## 2024-01-09 DIAGNOSIS — R04.0 EPISTAXIS: Primary | ICD-10-CM

## 2024-01-09 PROCEDURE — 1090F PRES/ABSN URINE INCON ASSESS: CPT | Performed by: OTOLARYNGOLOGY

## 2024-01-09 PROCEDURE — 99213 OFFICE O/P EST LOW 20 MIN: CPT | Performed by: OTOLARYNGOLOGY

## 2024-01-09 PROCEDURE — G8427 DOCREV CUR MEDS BY ELIG CLIN: HCPCS | Performed by: OTOLARYNGOLOGY

## 2024-01-09 PROCEDURE — G8484 FLU IMMUNIZE NO ADMIN: HCPCS | Performed by: OTOLARYNGOLOGY

## 2024-01-09 PROCEDURE — G8417 CALC BMI ABV UP PARAM F/U: HCPCS | Performed by: OTOLARYNGOLOGY

## 2024-01-09 PROCEDURE — 1036F TOBACCO NON-USER: CPT | Performed by: OTOLARYNGOLOGY

## 2024-01-09 PROCEDURE — 1123F ACP DISCUSS/DSCN MKR DOCD: CPT | Performed by: OTOLARYNGOLOGY

## 2024-01-10 NOTE — PROGRESS NOTES
Otolaryngology-Head and Neck Surgery  Follow Up Patient Visit     Patient: Antonietta Lima  YOB: 1935  MRN: [id number]  Date of Service: 1/09/2024    Chief Complaint:   Chief Complaint   Patient presents with    Follow-up    Epistaxis       Interval 1/9  Another episode of epistaxis last week - requiring ER visit, stopped with Afrin and nasal clamp     Has upcoming shoulder surgery     Interval hx 12/18  Developed another episode of epistaxis after I saw her last week  Required ER visit - stopped with Afrin and nasal clamp    History of Present Illness: Antonietta Lima is a 88 y.o. female who presents today for discussion of epistaxis    In the last 5 weeks has developed recurrent R epistaxis    On occasion triggered by hot temperature, hot showers, otherwise have been spontaneous    Typically self-limited but did have to go to the ER on 1 occasion.  No packing or cautery requirement    Last episode about 1 week ago    No prior nasal surgery    Anticoagulated on Eliquis    Past Medical History:  Past Medical History:   Diagnosis Date    Adverse effect of anesthesia     \"lost\" 4 days on Morphine-BACK SURGERY    Arrhythmia 12/28/2011    DR KNOWLES DCd DILTIAZEM DT NO ISSUES 2014, PT STATES SHE WENT INTO AFIB DURING RIGHT KNEE SURGERY IN 2014    Arthritis     Chronic pain     BOTH KNEES    GERD (gastroesophageal reflux disease)     CONTROLLED WITH OMEPRAZOLE    Hypertension     Ill-defined condition     constipation    Sebaceous cyst 04/14/2014    Snores        Past Surgical History:   Past Surgical History:   Procedure Laterality Date    APPENDECTOMY  1953    BREAST BIOPSY  2008    BREAST REDUCTION SURGERY  2006    CATARACT REMOVAL Bilateral     CERVICAL FUSION  1981    COLONOSCOPY      CYST INCISION AND DRAINAGE Left 11/15/2018    excision of sebaceous cyst left shoulder     DILATION AND CURETTAGE OF UTERUS  1973    GI  1975    hemorrhoidectomy    GI      appendectomy    HEENT      neck surgery

## 2024-01-11 ENCOUNTER — TELEPHONE (OUTPATIENT)
Age: 89
End: 2024-01-11

## 2024-01-11 NOTE — TELEPHONE ENCOUNTER
Dr. Rogelio Anderson called and wants a call back on his cell in regards to the patient.  (B) [phone]

## 2024-01-12 ENCOUNTER — TELEPHONE (OUTPATIENT)
Age: 89
End: 2024-01-12

## 2024-01-26 RX ORDER — APIXABAN 2.5 MG/1
2.5 TABLET, FILM COATED ORAL 2 TIMES DAILY
COMMUNITY
Start: 2024-01-10

## 2024-01-26 RX ORDER — AMIODARONE HYDROCHLORIDE 200 MG/1
200 TABLET ORAL DAILY
COMMUNITY
Start: 2024-01-08

## 2024-01-26 NOTE — PERIOP NOTE
PAT PREOP PHONE INTERVIEW COMPLETED WITH: PATIENT       FAMILY ADVISED NOT TO EAT/DRINK ANYTHING PAST MIDNIGHT EVENING PRIOR TO SURGERY,  NOTHING TO EAT/DRINK MORNING OF SURGERY UNLESS SIP OF WATER TO SWALLOW MEDICATION;   LEAVE ALL VALUABLES AT HOME;   DO BRING PICTURE ID, INSURANCE CARD AND ANY COPAY;  WEAR COMFORTABLE CLOTHING;  NO PERFUMES, POWDERS, LOTIONS;  NO ALCOHOL 24 HOURS BEFORE OR AFTER SURGERY;    WILL NEED TO BE DRIVEN HOME BY FAMILY OR FRIEND;   AVOID TAKING NSAIDS, ASPIRIN, FISH OIL, VITAMIN E OR GLUCOSAMINE/CHONDROITIN DURING THIS TIME PRIOR TO SURGERY;  MAY TAKE TYLENOL.  INSTRUCTED TO REPORT TO [facility] ADMITTING DEPARTMENT AT THE TIME GIVEN BY THE HOSPITAL.       CALLED CARDIOLOGY FOR LOV NOTES

## 2024-01-29 ENCOUNTER — HOSPITAL ENCOUNTER (OUTPATIENT)
Facility: HOSPITAL | Age: 89
Setting detail: OUTPATIENT SURGERY
Discharge: HOME | End: 2024-01-29
Attending: ORTHOPAEDIC SURGERY | Admitting: ORTHOPAEDIC SURGERY
Payer: MEDICARE

## 2024-01-29 ENCOUNTER — ANESTHESIA (OUTPATIENT)
Facility: HOSPITAL | Age: 89
End: 2024-01-29
Payer: MEDICARE

## 2024-01-29 ENCOUNTER — ANESTHESIA EVENT (OUTPATIENT)
Facility: HOSPITAL | Age: 89
End: 2024-01-29
Payer: MEDICARE

## 2024-01-29 VITALS
WEIGHT: 192 LBS | OXYGEN SATURATION: 95 % | HEART RATE: 65 BPM | TEMPERATURE: 97.7 F | SYSTOLIC BLOOD PRESSURE: 135 MMHG | BODY MASS INDEX: 35.33 KG/M2 | RESPIRATION RATE: 16 BRPM | DIASTOLIC BLOOD PRESSURE: 78 MMHG | HEIGHT: 62 IN

## 2024-01-29 PROCEDURE — 2500000003 HC RX 250 WO HCPCS: Performed by: NURSE ANESTHETIST, CERTIFIED REGISTERED

## 2024-01-29 PROCEDURE — 2580000003 HC RX 258: Performed by: NURSE ANESTHETIST, CERTIFIED REGISTERED

## 2024-01-29 PROCEDURE — 2580000003 HC RX 258: Performed by: ANESTHESIOLOGY

## 2024-01-29 PROCEDURE — 7100000000 HC PACU RECOVERY - FIRST 15 MIN: Performed by: ORTHOPAEDIC SURGERY

## 2024-01-29 PROCEDURE — 3600000004 HC SURGERY LEVEL 4 BASE: Performed by: ORTHOPAEDIC SURGERY

## 2024-01-29 PROCEDURE — 6360000002 HC RX W HCPCS: Performed by: ORTHOPAEDIC SURGERY

## 2024-01-29 PROCEDURE — 64415 NJX AA&/STRD BRCH PLXS IMG: CPT | Performed by: ANESTHESIOLOGY

## 2024-01-29 PROCEDURE — 7100000001 HC PACU RECOVERY - ADDTL 15 MIN: Performed by: ORTHOPAEDIC SURGERY

## 2024-01-29 PROCEDURE — 7100000011 HC PHASE II RECOVERY - ADDTL 15 MIN: Performed by: ORTHOPAEDIC SURGERY

## 2024-01-29 PROCEDURE — 3600000014 HC SURGERY LEVEL 4 ADDTL 15MIN: Performed by: ORTHOPAEDIC SURGERY

## 2024-01-29 PROCEDURE — 2709999900 HC NON-CHARGEABLE SUPPLY: Performed by: ORTHOPAEDIC SURGERY

## 2024-01-29 PROCEDURE — 3700000000 HC ANESTHESIA ATTENDED CARE: Performed by: ORTHOPAEDIC SURGERY

## 2024-01-29 PROCEDURE — 6360000002 HC RX W HCPCS: Performed by: ANESTHESIOLOGY

## 2024-01-29 PROCEDURE — 6360000002 HC RX W HCPCS: Performed by: NURSE ANESTHETIST, CERTIFIED REGISTERED

## 2024-01-29 PROCEDURE — 2580000003 HC RX 258: Performed by: ORTHOPAEDIC SURGERY

## 2024-01-29 PROCEDURE — 6360000002 HC RX W HCPCS: Performed by: STUDENT IN AN ORGANIZED HEALTH CARE EDUCATION/TRAINING PROGRAM

## 2024-01-29 PROCEDURE — 6370000000 HC RX 637 (ALT 250 FOR IP): Performed by: ANESTHESIOLOGY

## 2024-01-29 PROCEDURE — 7100000010 HC PHASE II RECOVERY - FIRST 15 MIN: Performed by: ORTHOPAEDIC SURGERY

## 2024-01-29 PROCEDURE — C1713 ANCHOR/SCREW BN/BN,TIS/BN: HCPCS | Performed by: ORTHOPAEDIC SURGERY

## 2024-01-29 PROCEDURE — 3700000001 HC ADD 15 MINUTES (ANESTHESIA): Performed by: ORTHOPAEDIC SURGERY

## 2024-01-29 DEVICE — BIO-COMPOSITE CRKSCRW 5.5X14.9MM
Type: IMPLANTABLE DEVICE | Site: SHOULDER | Status: FUNCTIONAL
Brand: ARTHREX®

## 2024-01-29 DEVICE — BIO-COMPSI CRKSCRW FT 6.5X 14.7MM
Type: IMPLANTABLE DEVICE | Site: SHOULDER | Status: FUNCTIONAL
Brand: ARTHREX®

## 2024-01-29 RX ORDER — ONDANSETRON 2 MG/ML
INJECTION INTRAMUSCULAR; INTRAVENOUS PRN
Status: DISCONTINUED | OUTPATIENT
Start: 2024-01-29 | End: 2024-01-29 | Stop reason: SDUPTHER

## 2024-01-29 RX ORDER — DIPHENHYDRAMINE HYDROCHLORIDE 50 MG/ML
12.5 INJECTION INTRAMUSCULAR; INTRAVENOUS
Status: DISCONTINUED | OUTPATIENT
Start: 2024-01-29 | End: 2024-01-29 | Stop reason: HOSPADM

## 2024-01-29 RX ORDER — SODIUM CHLORIDE 9 MG/ML
INJECTION, SOLUTION INTRAVENOUS PRN
Status: DISCONTINUED | OUTPATIENT
Start: 2024-01-29 | End: 2024-01-29 | Stop reason: HOSPADM

## 2024-01-29 RX ORDER — HYDRALAZINE HYDROCHLORIDE 20 MG/ML
10 INJECTION INTRAMUSCULAR; INTRAVENOUS
Status: DISCONTINUED | OUTPATIENT
Start: 2024-01-29 | End: 2024-01-29 | Stop reason: HOSPADM

## 2024-01-29 RX ORDER — ROPIVACAINE HYDROCHLORIDE 5 MG/ML
INJECTION, SOLUTION EPIDURAL; INFILTRATION; PERINEURAL
Status: COMPLETED | OUTPATIENT
Start: 2024-01-29 | End: 2024-01-29

## 2024-01-29 RX ORDER — OXYCODONE HYDROCHLORIDE AND ACETAMINOPHEN 5; 325 MG/1; MG/1
1 TABLET ORAL EVERY 4 HOURS PRN
Status: DISCONTINUED | OUTPATIENT
Start: 2024-01-29 | End: 2024-01-29 | Stop reason: HOSPADM

## 2024-01-29 RX ORDER — FENTANYL CITRATE 50 UG/ML
25 INJECTION, SOLUTION INTRAMUSCULAR; INTRAVENOUS EVERY 5 MIN PRN
Status: DISCONTINUED | OUTPATIENT
Start: 2024-01-29 | End: 2024-01-29 | Stop reason: HOSPADM

## 2024-01-29 RX ORDER — LORAZEPAM 2 MG/ML
0.5 INJECTION INTRAMUSCULAR
Status: DISCONTINUED | OUTPATIENT
Start: 2024-01-29 | End: 2024-01-29 | Stop reason: RX

## 2024-01-29 RX ORDER — ROCURONIUM BROMIDE 10 MG/ML
INJECTION, SOLUTION INTRAVENOUS PRN
Status: DISCONTINUED | OUTPATIENT
Start: 2024-01-29 | End: 2024-01-29 | Stop reason: SDUPTHER

## 2024-01-29 RX ORDER — SODIUM CHLORIDE 0.9 % (FLUSH) 0.9 %
5-40 SYRINGE (ML) INJECTION PRN
Status: DISCONTINUED | OUTPATIENT
Start: 2024-01-29 | End: 2024-01-29 | Stop reason: HOSPADM

## 2024-01-29 RX ORDER — IPRATROPIUM BROMIDE AND ALBUTEROL SULFATE 2.5; .5 MG/3ML; MG/3ML
1 SOLUTION RESPIRATORY (INHALATION)
Status: DISCONTINUED | OUTPATIENT
Start: 2024-01-29 | End: 2024-01-29 | Stop reason: HOSPADM

## 2024-01-29 RX ORDER — ONDANSETRON 2 MG/ML
4 INJECTION INTRAMUSCULAR; INTRAVENOUS
Status: DISCONTINUED | OUTPATIENT
Start: 2024-01-29 | End: 2024-01-29 | Stop reason: HOSPADM

## 2024-01-29 RX ORDER — DEXAMETHASONE SODIUM PHOSPHATE 4 MG/ML
INJECTION, SOLUTION INTRA-ARTICULAR; INTRALESIONAL; INTRAMUSCULAR; INTRAVENOUS; SOFT TISSUE PRN
Status: DISCONTINUED | OUTPATIENT
Start: 2024-01-29 | End: 2024-01-29 | Stop reason: SDUPTHER

## 2024-01-29 RX ORDER — HYDROMORPHONE HYDROCHLORIDE 1 MG/ML
0.5 INJECTION, SOLUTION INTRAMUSCULAR; INTRAVENOUS; SUBCUTANEOUS EVERY 5 MIN PRN
Status: DISCONTINUED | OUTPATIENT
Start: 2024-01-29 | End: 2024-01-29 | Stop reason: HOSPADM

## 2024-01-29 RX ORDER — OXYCODONE HYDROCHLORIDE 5 MG/1
5 TABLET ORAL
Status: DISCONTINUED | OUTPATIENT
Start: 2024-01-29 | End: 2024-01-29 | Stop reason: HOSPADM

## 2024-01-29 RX ORDER — ONDANSETRON 2 MG/ML
4 INJECTION INTRAMUSCULAR; INTRAVENOUS EVERY 6 HOURS PRN
Status: DISCONTINUED | OUTPATIENT
Start: 2024-01-29 | End: 2024-01-29 | Stop reason: HOSPADM

## 2024-01-29 RX ORDER — SODIUM CHLORIDE 0.9 % (FLUSH) 0.9 %
5-40 SYRINGE (ML) INJECTION EVERY 12 HOURS SCHEDULED
Status: DISCONTINUED | OUTPATIENT
Start: 2024-01-29 | End: 2024-01-29 | Stop reason: HOSPADM

## 2024-01-29 RX ORDER — LIDOCAINE HYDROCHLORIDE 10 MG/ML
1 INJECTION, SOLUTION EPIDURAL; INFILTRATION; INTRACAUDAL; PERINEURAL
Status: DISCONTINUED | OUTPATIENT
Start: 2024-01-29 | End: 2024-01-29 | Stop reason: HOSPADM

## 2024-01-29 RX ORDER — LIDOCAINE HYDROCHLORIDE 20 MG/ML
INJECTION, SOLUTION EPIDURAL; INFILTRATION; INTRACAUDAL; PERINEURAL PRN
Status: DISCONTINUED | OUTPATIENT
Start: 2024-01-29 | End: 2024-01-29 | Stop reason: SDUPTHER

## 2024-01-29 RX ORDER — OXYCODONE HYDROCHLORIDE AND ACETAMINOPHEN 5; 325 MG/1; MG/1
2 TABLET ORAL EVERY 4 HOURS PRN
Status: DISCONTINUED | OUTPATIENT
Start: 2024-01-29 | End: 2024-01-29 | Stop reason: HOSPADM

## 2024-01-29 RX ORDER — SUCCINYLCHOLINE/SOD CL,ISO/PF 200MG/10ML
SYRINGE (ML) INTRAVENOUS PRN
Status: DISCONTINUED | OUTPATIENT
Start: 2024-01-29 | End: 2024-01-29 | Stop reason: SDUPTHER

## 2024-01-29 RX ORDER — FENTANYL CITRATE 50 UG/ML
INJECTION, SOLUTION INTRAMUSCULAR; INTRAVENOUS PRN
Status: DISCONTINUED | OUTPATIENT
Start: 2024-01-29 | End: 2024-01-29 | Stop reason: SDUPTHER

## 2024-01-29 RX ORDER — SODIUM CHLORIDE, SODIUM LACTATE, POTASSIUM CHLORIDE, CALCIUM CHLORIDE 600; 310; 30; 20 MG/100ML; MG/100ML; MG/100ML; MG/100ML
INJECTION, SOLUTION INTRAVENOUS CONTINUOUS
Status: DISCONTINUED | OUTPATIENT
Start: 2024-01-29 | End: 2024-01-29 | Stop reason: HOSPADM

## 2024-01-29 RX ORDER — MIDAZOLAM HYDROCHLORIDE 2 MG/2ML
2 INJECTION, SOLUTION INTRAMUSCULAR; INTRAVENOUS
Status: COMPLETED | OUTPATIENT
Start: 2024-01-29 | End: 2024-01-29

## 2024-01-29 RX ORDER — FENTANYL CITRATE 50 UG/ML
100 INJECTION, SOLUTION INTRAMUSCULAR; INTRAVENOUS
Status: COMPLETED | OUTPATIENT
Start: 2024-01-29 | End: 2024-01-29

## 2024-01-29 RX ORDER — ACETAMINOPHEN 500 MG
1000 TABLET ORAL ONCE
Status: COMPLETED | OUTPATIENT
Start: 2024-01-29 | End: 2024-01-29

## 2024-01-29 RX ORDER — PROCHLORPERAZINE EDISYLATE 5 MG/ML
5 INJECTION INTRAMUSCULAR; INTRAVENOUS
Status: DISCONTINUED | OUTPATIENT
Start: 2024-01-29 | End: 2024-01-29 | Stop reason: HOSPADM

## 2024-01-29 RX ORDER — GLYCOPYRROLATE 0.2 MG/ML
INJECTION, SOLUTION INTRAMUSCULAR; INTRAVENOUS PRN
Status: DISCONTINUED | OUTPATIENT
Start: 2024-01-29 | End: 2024-01-29 | Stop reason: SDUPTHER

## 2024-01-29 RX ADMIN — PROPOFOL 100 MG: 10 INJECTION, EMULSION INTRAVENOUS at 13:39

## 2024-01-29 RX ADMIN — MIDAZOLAM 2 MG: 1 INJECTION INTRAMUSCULAR; INTRAVENOUS at 13:16

## 2024-01-29 RX ADMIN — FENTANYL CITRATE 50 MCG: 50 INJECTION INTRAMUSCULAR; INTRAVENOUS at 13:16

## 2024-01-29 RX ADMIN — ONDANSETRON 4 MG: 2 INJECTION INTRAMUSCULAR; INTRAVENOUS at 16:30

## 2024-01-29 RX ADMIN — DEXAMETHASONE SODIUM PHOSPHATE 8 MG: 4 INJECTION, SOLUTION INTRAMUSCULAR; INTRAVENOUS at 13:52

## 2024-01-29 RX ADMIN — WATER 2000 MG: 1 INJECTION INTRAMUSCULAR; INTRAVENOUS; SUBCUTANEOUS at 13:50

## 2024-01-29 RX ADMIN — ROCURONIUM BROMIDE 5 MG: 10 SOLUTION INTRAVENOUS at 13:39

## 2024-01-29 RX ADMIN — GLYCOPYRROLATE 0.2 MG: 0.2 INJECTION, SOLUTION INTRAMUSCULAR; INTRAVENOUS at 14:15

## 2024-01-29 RX ADMIN — ACETAMINOPHEN 1000 MG: 500 TABLET ORAL at 13:06

## 2024-01-29 RX ADMIN — SUGAMMADEX 200 MG: 100 INJECTION, SOLUTION INTRAVENOUS at 16:49

## 2024-01-29 RX ADMIN — ROCURONIUM BROMIDE 25 MG: 10 SOLUTION INTRAVENOUS at 13:50

## 2024-01-29 RX ADMIN — SODIUM CHLORIDE, POTASSIUM CHLORIDE, SODIUM LACTATE AND CALCIUM CHLORIDE: 600; 310; 30; 20 INJECTION, SOLUTION INTRAVENOUS at 12:20

## 2024-01-29 RX ADMIN — ROPIVACAINE HYDROCHLORIDE 30 ML: 5 INJECTION, SOLUTION EPIDURAL; INFILTRATION; PERINEURAL at 13:13

## 2024-01-29 RX ADMIN — PROPOFOL 100 MCG/KG/MIN: 10 INJECTION, EMULSION INTRAVENOUS at 13:45

## 2024-01-29 RX ADMIN — LIDOCAINE HYDROCHLORIDE 50 MG: 20 INJECTION, SOLUTION EPIDURAL; INFILTRATION; INTRACAUDAL; PERINEURAL at 13:39

## 2024-01-29 RX ADMIN — PHENYLEPHRINE HYDROCHLORIDE 40 MCG/MIN: 10 INJECTION INTRAVENOUS at 13:52

## 2024-01-29 RX ADMIN — SODIUM CHLORIDE: 9 INJECTION, SOLUTION INTRAVENOUS at 16:46

## 2024-01-29 RX ADMIN — GLYCOPYRROLATE 0.2 MG: 0.2 INJECTION, SOLUTION INTRAMUSCULAR; INTRAVENOUS at 14:00

## 2024-01-29 RX ADMIN — Medication 100 MG: at 13:39

## 2024-01-29 RX ADMIN — FENTANYL CITRATE 50 MCG: 50 INJECTION, SOLUTION INTRAMUSCULAR; INTRAVENOUS at 13:39

## 2024-01-29 ASSESSMENT — PAIN - FUNCTIONAL ASSESSMENT: PAIN_FUNCTIONAL_ASSESSMENT: NONE - DENIES PAIN

## 2024-01-29 NOTE — FLOWSHEET NOTE
01/29/24 1450   Family Communication    Relationship to Patient Daughter-in-law    Phone Number [phone]   Family/Significant Other Update Called   Delivery Origin Nurse   Message Disposition Family present - message delivered   Update Given Yes   Family Communication   Family Update Message Procedure started

## 2024-01-29 NOTE — BRIEF OP NOTE
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 Item Serial No.  Lot No. LRB No. Used Action   ANCHOR SUT L14.7MM DIA5.5MM BIOCOMPOSITE W/ 3 SZ 2 - SNA  ANCHOR SUT L14.7MM DIA5.5MM BIOCOMPOSITE W/ 3 SZ 2 NA [employer] [id number] Left 1 Implanted   ANCHOR SUT L14.7MM DIA6.5MM BIOCOMP W/ TWO SZ 2 [employer] - SNA  ANCHOR SUT L14.7MM DIA6.5MM BIOCOMP W/ TWO SZ 2 [employer] NA [employer] [id number] Left 1 Implanted         Drains: * No LDAs found *    Findings: large rotator cuff tear with osteopenia to proximal humerus, unable to hold anchor. AC arthritis, Acromial spurring, mild to moderate GH arthritis, labral tear and intra-articular biceps tendon partial tear      Electronically signed by Sravan Pepper MD on 1/29/2024 at 4:53 PM

## 2024-01-29 NOTE — PROGRESS NOTES
Discharge instructions reviewed with patient and family using teachback. All questions have been answered.Vital signs stable, pain appropriately managed. Patient wheeled off the unit with PACU staff.

## 2024-01-29 NOTE — ANESTHESIA PRE PROCEDURE
IN 2014   • Arthritis    • Chronic pain     BOTH KNEES   • GERD (gastroesophageal reflux disease)     CONTROLLED WITH OMEPRAZOLE   • Hypertension    • Ill-defined condition     constipation   • Kidney stones    • Sebaceous cyst 04/14/2014   • Snores        Past Surgical History:        Procedure Laterality Date   • APPENDECTOMY  1953   • BREAST BIOPSY  2008   • BREAST REDUCTION SURGERY  2006   • CATARACT REMOVAL Bilateral    • CERVICAL FUSION  1981   • COLONOSCOPY  2014   • CYST INCISION AND DRAINAGE Left 11/15/2018    excision of sebaceous cyst left shoulder    • DILATION AND CURETTAGE OF UTERUS  1973   • GI  1975    hemorrhoidectomy   • GI      appendectomy   • HEENT      neck surgery cervical discectomy   • HERNIA REPAIR  1974    UMBILICAL   • HYSTERECTOMY (CERVIX STATUS UNKNOWN)  1976    VAGINAL   • MOHS SURGERY  2005    right shoulder   • ORTHOPEDIC SURGERY  1991    left leg/ankle fx repair   • ORTHOPEDIC SURGERY  2004    right BUNIONECTOMY   • ORTHOPEDIC SURGERY  2009    RIGHT HAMMERTOE   • ORTHOPEDIC SURGERY  2015    R TOTAL KNEE   • ORTHOPEDIC SURGERY  2012    back fusion   • OTHER SURGICAL HISTORY  05/15/2013    surgery on neck glands/Parathyroid removed   • OTHER SURGICAL HISTORY  04/14/2014    cyst removed from back   • PARATHYROIDECTOMY  2013   • TOTAL KNEE ARTHROPLASTY Left 08/11/2016   • TOTAL KNEE ARTHROPLASTY Left 01/18/2023    revision   • UROLOGICAL SURGERY  2012    lithotrypsy , 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        Social History:    Social History     Tobacco Use   • Smoking status: Never   • Smokeless tobacco: Never   Substance Use Topics   • Alcohol use: No                                Counseling given: Not Answered      Vital Signs (Current):   Vitals:    01/26/24 1422 01/29/24 1209   BP:  (!) 145/84   Pulse:  68   Resp:  18   Temp:  98.6 °F (37 °C)   TempSrc:  Oral   SpO2:  97%   Weight: 86.2 kg (190 lb) 87.1 kg (192 lb)   Height: 1.575 m (5' 2\") 1.575 m (5' 2\")

## 2024-01-29 NOTE — ANESTHESIA POSTPROCEDURE EVALUATION
Department of Anesthesiology  Postprocedure Note    Patient: Antonietta Lima  MRN: [id number]  YOB: 1935  Date of evaluation: 1/29/2024    Procedure Summary       Date: 01/29/24 Room / Location: [facility]  / [facility]    Anesthesia Start: 1330 Anesthesia Stop: 1705    Procedure: LEFT SHOULDER ARTHROSCOPIC EXAM WITH ROTATOR CUFF REPAIR, ACROMIOPLASTY, DISTAL CLAVICLE RESECTION, EXTENSIVE DEBRIDEMENT AND OPEN BICEP TENODESIS (CHOICE/BLOCK) (Left: Shoulder) Diagnosis:       Arthritis of left shoulder region      Shoulder impingement, left      Nontraumatic complete tear of left rotator cuff      Labral tear of shoulder, left, subsequent encounter      (Arthritis of left shoulder region [M19.012])      (Shoulder impingement, left [M25.812])      (Nontraumatic complete tear of left rotator cuff [M75.122])      (Labral tear of shoulder, left, subsequent encounter [S43.432D])    Providers: Reginaldo Bella MD Responsible Provider: Orlando John MD    Anesthesia Type: general, regional ASA Status: 2            Anesthesia Type: No value filed.    Castillo Phase I: Castillo Score: 10    Castillo Phase II: Castillo Score: 10    Anesthesia Post Evaluation    Patient location during evaluation: PACU  Patient participation: complete - patient participated  Level of consciousness: awake  Airway patency: patent  Nausea & Vomiting: no nausea  Cardiovascular status: blood pressure returned to baseline and hemodynamically stable  Respiratory status: acceptable  Hydration status: stable  Multimodal analgesia pain management approach    No notable events documented.

## 2024-01-29 NOTE — DISCHARGE INSTRUCTIONS
Please see Dr. Bella post op instruction handout in chart    ______________________________________________________________________    Anesthesia Discharge Instructions    After general anesthesia or intervenous sedation, for 24 hours or while taking prescription Narcotics:  Limit your activities  Do not drive or operate hazardous machinery  If you have not urinated within 8 hours after discharge, please contact your surgeon on call.  Do not make important personal or business decisions  Do not drink alcoholic beverages    Report the following to your surgeon:  Excessive pain, swelling, redness or odor of or around the surgical area  Temperature over 100.5 degrees  Nausea and vomiting lasting longer than 4 hours or if unable to take medication  Any signs of decreased circulation or nerve impairment to extremity:  Change in color, persistent numbness, tingling, coldness or increased pain.  Any questions

## 2024-01-29 NOTE — ANESTHESIA PROCEDURE NOTES
Peripheral Block    Patient location during procedure: pre-op  Reason for block: procedure for pain, post-op pain management and at surgeon's request  Start time: 1/29/2024 1:13 PM  End time: 1/29/2024 1:18 PM  Staffing  Performed: anesthesiologist   Anesthesiologist: Paula Barker DO  Performed by: Paula Barker DO  Authorized by: Paula Barker DO    Preanesthetic Checklist  Completed: patient identified, IV checked, site marked, risks and benefits discussed, surgical/procedural consents, equipment checked, pre-op evaluation, timeout performed, anesthesia consent given, oxygen available, monitors applied/VS acknowledged and fire risk safety assessment completed and verbalized  Peripheral Block   Patient position: supine  Prep: ChloraPrep  Provider prep: mask and sterile gloves  Patient monitoring: cardiac monitor, continuous pulse ox, frequent blood pressure checks, IV access and oxygen  Block type: Brachial plexus  Interscalene  Laterality: left  Injection technique: single-shot  Guidance: ultrasound guided    Needle   Needle type: insulated echogenic nerve stimulator needle   Needle gauge: 21 G  Needle localization: anatomical landmarks and ultrasound guidance  Needle length: 10 cm  Assessment   Injection assessment: negative aspiration for heme, no paresthesia on injection, local visualized surrounding nerve on ultrasound and no intravascular symptoms  Paresthesia pain: none  Slow fractionated injection: yes  Hemodynamics: stable  Real-time US image taken/store: yes  Outcomes: uncomplicated and patient tolerated procedure well    Medications Administered  ropivacaine (NAROPIN) injection 0.5% - Perineural   30 mL - 1/29/2024 1:13:00 PM

## 2024-01-30 NOTE — OP NOTE
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    PROCEDURES PERFORMED:  1.  Left shoulder arthroscopic/mini open rotator cuff repair.  2.  Left shoulder arthroscopic distal clavicle resection.  3.  Left shoulder arthroscopic extensive debridement of labrum, rotator cuff, glenoid chondral defect, humeral head chondral defect, synovium, and bursa.  4.  Left shoulder arthroscopic acromioplasty.  5.  Left shoulder open biceps tenodesis.    SURGEON:  Reginaldo Bella MD    FIRST ASSISTANT:  Sravan Pepper MD    ANESTHESIA:  General with block.    COMPLICATIONS:  None.    SPECIMENS REMOVED:  None.    IMPLANTS:  A single screw and a ceramic spiked washer and a plastic button.    DRAINS:  None.    ESTIMATED BLOOD LOSS:  20 mL.    PROCEDURE:  The patient was taken to the operating room, placed in supine position on the operating table, and underwent induction of general anesthesia.  The left shoulder was identified as the correct operative site.  After a time-out was performed, the exam under anesthesia showed no ligamentous laxity or limitation of motion.  The arm was prepped and draped in a sterile fashion.  A posterior portal was made for insertion of arthroscope.  On visualization of the joint, there was a greater than 50% biceps tendon tear.  There was diffuse labral tearing with a glenoid and chondral articular cartilage defect.  There was a full thickness rotator cuff/supraspinatus tendon tear.  An anterior portal was made in

## 2024-02-19 ENCOUNTER — PROCEDURE VISIT (OUTPATIENT)
Age: 89
End: 2024-02-19
Payer: MEDICARE

## 2024-02-19 VITALS
DIASTOLIC BLOOD PRESSURE: 60 MMHG | WEIGHT: 187 LBS | HEART RATE: 113 BPM | OXYGEN SATURATION: 97 % | SYSTOLIC BLOOD PRESSURE: 112 MMHG | RESPIRATION RATE: 16 BRPM | HEIGHT: 62 IN | BODY MASS INDEX: 34.41 KG/M2

## 2024-02-19 DIAGNOSIS — Z79.01 ANTICOAGULATED: ICD-10-CM

## 2024-02-19 DIAGNOSIS — R04.0 EPISTAXIS: Primary | ICD-10-CM

## 2024-02-19 DIAGNOSIS — J31.0 CHRONIC RHINITIS: ICD-10-CM

## 2024-02-19 PROCEDURE — G8427 DOCREV CUR MEDS BY ELIG CLIN: HCPCS | Performed by: OTOLARYNGOLOGY

## 2024-02-19 PROCEDURE — G8484 FLU IMMUNIZE NO ADMIN: HCPCS | Performed by: OTOLARYNGOLOGY

## 2024-02-19 PROCEDURE — G8417 CALC BMI ABV UP PARAM F/U: HCPCS | Performed by: OTOLARYNGOLOGY

## 2024-02-19 PROCEDURE — 99213 OFFICE O/P EST LOW 20 MIN: CPT | Performed by: OTOLARYNGOLOGY

## 2024-02-19 PROCEDURE — 1090F PRES/ABSN URINE INCON ASSESS: CPT | Performed by: OTOLARYNGOLOGY

## 2024-02-19 PROCEDURE — 30903 CONTROL OF NOSEBLEED: CPT | Performed by: OTOLARYNGOLOGY

## 2024-02-19 PROCEDURE — 1036F TOBACCO NON-USER: CPT | Performed by: OTOLARYNGOLOGY

## 2024-02-19 PROCEDURE — 1123F ACP DISCUSS/DSCN MKR DOCD: CPT | Performed by: OTOLARYNGOLOGY

## 2024-02-19 NOTE — PROGRESS NOTES
Otolaryngology-Head and Neck Surgery  Follow Up Patient Visit     Patient: Antonietta Lima  YOB: 1935  MRN: [id number]  Date of Service: 2/19/2024      Chief Complaint:   No chief complaint on file.    Interval hx 2/19/2024  Had shoulder surgery  Has had less severe episode of epistaxis last week     Interval hx 1/9  Another episode of epistaxis last week - requiring ER visit, stopped with Afrin and nasal clamp     Has upcoming shoulder surgery     Interval hx 12/18  Developed another episode of epistaxis after I saw her last week  Required ER visit - stopped with Afrin and nasal clamp    History of Present Illness: Antonietta Lima is a 88 y.o. female who presents today for discussion of epistaxis    In the last 5 weeks has developed recurrent R epistaxis    On occasion triggered by hot temperature, hot showers, otherwise have been spontaneous    Typically self-limited but did have to go to the ER on 1 occasion.  No packing or cautery requirement    Last episode about 1 week ago    No prior nasal surgery    Anticoagulated on Eliquis    Past Medical History:  Past Medical History:   Diagnosis Date    Adverse effect of anesthesia     \"lost\" 4 days on Morphine-BACK SURGERY    Arrhythmia 12/28/2011    DR KNOWLES DCd DILTIAZEM DT NO ISSUES 2014, PT STATES SHE WENT INTO AFIB DURING RIGHT KNEE SURGERY IN 2014    Arthritis     Chronic pain     BOTH KNEES    GERD (gastroesophageal reflux disease)     CONTROLLED WITH OMEPRAZOLE    Hypertension     Ill-defined condition     constipation    Kidney stones     Sebaceous cyst 04/14/2014    Snores        Past Surgical History:   Past Surgical History:   Procedure Laterality Date    APPENDECTOMY  1953    BREAST BIOPSY  2008    BREAST REDUCTION SURGERY  2006    CATARACT REMOVAL Bilateral     CERVICAL FUSION  1981    COLONOSCOPY  2014    CYST INCISION AND DRAINAGE Left 11/15/2018    excision of sebaceous cyst left shoulder     DILATION AND CURETTAGE OF UTERUS  1973

## 2024-03-12 ENCOUNTER — OFFICE VISIT (OUTPATIENT)
Age: 89
End: 2024-03-12
Payer: MEDICARE

## 2024-03-12 VITALS
BODY MASS INDEX: 33.86 KG/M2 | SYSTOLIC BLOOD PRESSURE: 126 MMHG | OXYGEN SATURATION: 98 % | HEIGHT: 62 IN | HEART RATE: 58 BPM | RESPIRATION RATE: 16 BRPM | DIASTOLIC BLOOD PRESSURE: 74 MMHG | WEIGHT: 184 LBS

## 2024-03-12 DIAGNOSIS — R04.0 EPISTAXIS: ICD-10-CM

## 2024-03-12 DIAGNOSIS — J31.0 CHRONIC RHINITIS: ICD-10-CM

## 2024-03-12 DIAGNOSIS — Z79.01 ANTICOAGULATED: Primary | ICD-10-CM

## 2024-03-12 PROCEDURE — 1036F TOBACCO NON-USER: CPT | Performed by: OTOLARYNGOLOGY

## 2024-03-12 PROCEDURE — 99213 OFFICE O/P EST LOW 20 MIN: CPT | Performed by: OTOLARYNGOLOGY

## 2024-03-12 PROCEDURE — G8427 DOCREV CUR MEDS BY ELIG CLIN: HCPCS | Performed by: OTOLARYNGOLOGY

## 2024-03-12 PROCEDURE — 1123F ACP DISCUSS/DSCN MKR DOCD: CPT | Performed by: OTOLARYNGOLOGY

## 2024-03-12 PROCEDURE — G8484 FLU IMMUNIZE NO ADMIN: HCPCS | Performed by: OTOLARYNGOLOGY

## 2024-03-12 PROCEDURE — G8417 CALC BMI ABV UP PARAM F/U: HCPCS | Performed by: OTOLARYNGOLOGY

## 2024-03-12 PROCEDURE — 1090F PRES/ABSN URINE INCON ASSESS: CPT | Performed by: OTOLARYNGOLOGY

## 2024-03-12 NOTE — PROGRESS NOTES
Otolaryngology-Head and Neck Surgery  Follow Up Patient Visit     Patient: Antonietta Lima  YOB: 1935  MRN: [id number]  Date of Service: 3/12/2024      Chief Complaint:   Chief Complaint   Patient presents with    Follow-up    Epistaxis     Interval hx 3/12/2024  Doing well without further epistaxis    Interval hx 2/2024  Had shoulder surgery  Has had less severe episode of epistaxis last week     Interval hx 1/9  Another episode of epistaxis last week - requiring ER visit, stopped with Afrin and nasal clamp     Has upcoming shoulder surgery     Interval hx 12/18  Developed another episode of epistaxis after I saw her last week  Required ER visit - stopped with Afrin and nasal clamp    History of Present Illness: Antonietta Lima is a 88 y.o. female who presents today for discussion of epistaxis    In the last 5 weeks has developed recurrent R epistaxis    On occasion triggered by hot temperature, hot showers, otherwise have been spontaneous    Typically self-limited but did have to go to the ER on 1 occasion.  No packing or cautery requirement    Last episode about 1 week ago    No prior nasal surgery    Anticoagulated on Eliquis    Past Medical History:  Past Medical History:   Diagnosis Date    Adverse effect of anesthesia     \"lost\" 4 days on Morphine-BACK SURGERY    Arrhythmia 12/28/2011    DR KNOWLES DCd DILTIAZEM DT NO ISSUES 2014, PT STATES SHE WENT INTO AFIB DURING RIGHT KNEE SURGERY IN 2014    Arthritis     Chronic pain     BOTH KNEES    GERD (gastroesophageal reflux disease)     CONTROLLED WITH OMEPRAZOLE    Hypertension     Ill-defined condition     constipation    Kidney stones     Sebaceous cyst 04/14/2014    Snores        Past Surgical History:   Past Surgical History:   Procedure Laterality Date    APPENDECTOMY  1953    BREAST BIOPSY  2008    BREAST REDUCTION SURGERY  2006    CATARACT REMOVAL Bilateral     CERVICAL FUSION  1981    COLONOSCOPY  2014    CYST INCISION AND DRAINAGE Left

## 2024-06-05 ENCOUNTER — HOSPITAL ENCOUNTER (OUTPATIENT)
Facility: HOSPITAL | Age: 89
Discharge: HOME | End: 2024-06-08
Payer: MEDICARE

## 2024-06-05 DIAGNOSIS — Z98.890 STATUS POST SHOULDER SURGERY: ICD-10-CM

## 2024-06-05 DIAGNOSIS — M12.812 ROTATOR CUFF ARTHROPATHY OF LEFT SHOULDER: ICD-10-CM

## 2024-06-05 PROCEDURE — 73200 CT UPPER EXTREMITY W/O DYE: CPT

## 2024-11-04 ENCOUNTER — HOSPITAL ENCOUNTER (OUTPATIENT)
Facility: HOSPITAL | Age: 89
Discharge: HOME | End: 2024-11-07
Payer: MEDICARE

## 2024-11-04 DIAGNOSIS — I48.0 PAROXYSMAL ATRIAL FIBRILLATION (HCC): ICD-10-CM

## 2024-11-04 PROCEDURE — 71046 X-RAY EXAM CHEST 2 VIEWS: CPT

## 2025-05-22 ENCOUNTER — TRANSCRIBE ORDERS (OUTPATIENT)
Facility: HOSPITAL | Age: 89
End: 2025-05-22

## 2025-05-22 DIAGNOSIS — Z12.31 ENCOUNTER FOR SCREENING MAMMOGRAM FOR MALIGNANT NEOPLASM OF BREAST: Primary | ICD-10-CM

## 2025-06-11 ENCOUNTER — HOSPITAL ENCOUNTER (OUTPATIENT)
Facility: HOSPITAL | Age: 89
Discharge: HOME | End: 2025-06-14
Attending: FAMILY MEDICINE
Payer: MEDICARE

## 2025-06-11 DIAGNOSIS — Z12.31 ENCOUNTER FOR SCREENING MAMMOGRAM FOR MALIGNANT NEOPLASM OF BREAST: ICD-10-CM

## 2025-06-11 PROCEDURE — 77063 BREAST TOMOSYNTHESIS BI: CPT

## (undated) DEVICE — SYRINGE 20ML LL S/C 50

## (undated) DEVICE — Device

## (undated) DEVICE — SYRINGE MED 10ML LUERLOCK TIP W/O SFTY DISP

## (undated) DEVICE — SYR 10ML LUER LOK 1/5ML GRAD --

## (undated) DEVICE — BRUSH SCRB DRY NL CLN LF GRN --

## (undated) DEVICE — SUTURE STRATAFIX SPRL SZ 1 L14IN ABSRB VLT L48CM CTX 1/2 SXPD2B405

## (undated) DEVICE — SUTURE VCRL SZ 0 L27IN ABSRB UD L36MM CT-1 1/2 CIR J260H

## (undated) DEVICE — YANKAUER,OPEN TIP,W/O VENT,STERILE: Brand: MEDLINE INDUSTRIES, INC.

## (undated) DEVICE — SCRUB DRY SURG EZ SCRUB BRUSH PREOPERATIVE GRN

## (undated) DEVICE — HYPODERMIC SAFETY NEEDLE: Brand: MAGELLAN

## (undated) DEVICE — DYONICS 25 INFLOW TUBE SET, 3 PER BOX

## (undated) DEVICE — SHOULDER W/POUCH II-LF: Brand: MEDLINE INDUSTRIES, INC.

## (undated) DEVICE — SUTURE VCRL SZ 2-0 L36IN ABSRB UD L40MM CT 1/2 CIR J957H

## (undated) DEVICE — DERMABOND SKIN ADH 0.7ML -- DERMABOND ADVANCED 12/BX

## (undated) DEVICE — SUTURE MCRYL SZ 3-0 L27IN ABSRB UD L24MM PS-1 3/8 CIR PRIM Y936H

## (undated) DEVICE — SOLUTION IRRIG 3000ML LAC RINGERS ARTHROMTC PLAS CONT

## (undated) DEVICE — GLOVE SURG SZ 65 L12IN FNGR THK94MIL STD WHT LTX FREE

## (undated) DEVICE — 4-PORT MANIFOLD: Brand: NEPTUNE 2

## (undated) DEVICE — PADDING CAST W6INXL4YD ST COT RAYON MICROPLEATED HIGHLY

## (undated) DEVICE — SYR 20ML LL STRL LF --

## (undated) DEVICE — SUTURE VCRL SZ 0 L36IN ABSRB VLT L36MM CT-1 1/2 CIR J346H

## (undated) DEVICE — TOTAL JOINT - SMH: Brand: MEDLINE INDUSTRIES, INC.

## (undated) DEVICE — TOTAL TRAY, 16FR 10ML SIL FOLEY, URN: Brand: MEDLINE

## (undated) DEVICE — PADDING CAST SPEC 6INX4YD COT --

## (undated) DEVICE — SOLUTION SURG PREP 26 CC PURPREP

## (undated) DEVICE — GLOVE SURG SZ 7 L12IN FNGR THK79MIL GRN LTX FREE

## (undated) DEVICE — SUTURE FIBERWIRE SZ 2 W/ TAPERED NEEDLE BLUE L38IN NONABSORB BLU L26.5MM 1/2 CIRCLE AR7200

## (undated) DEVICE — GLOVE ORANGE PI 7   MSG9070

## (undated) DEVICE — STERILE POLYISOPRENE POWDER-FREE SURGICAL GLOVES WITH EMOLLIENT COATING: Brand: PROTEXIS

## (undated) DEVICE — ADHESIVE SKIN CLSR 0.7ML TOP DERMBND ADV

## (undated) DEVICE — DRAPE,REIN 53X77,STERILE: Brand: MEDLINE

## (undated) DEVICE — STERILE POLYISOPRENE POWDER-FREE SURGICAL GLOVES: Brand: PROTEXIS

## (undated) DEVICE — SUTURE ETHBND EXCEL SZ 2 L30IN NONABSORBABLE GRN L40MM V-37 MX69G

## (undated) DEVICE — GOWN,SIRUS,POLYRNF,BRTHSLV,XLN/XL,20/CS: Brand: MEDLINE

## (undated) DEVICE — DRAPE,EXTREMITY,89X128,STERILE: Brand: MEDLINE

## (undated) DEVICE — 4.5 MM INCISOR PLUS STRAIGHT                                    BLADES, POWER/EP-1, VIOLET, PACKAGED                                    6 PER BOX, STERILE

## (undated) DEVICE — DRESSING HYDROFIBER AQUACEL AG ADVANTAGE 3.5X12 IN

## (undated) DEVICE — PREP SKN CHLRAPRP APL 26ML STR --

## (undated) DEVICE — STRYKER PERFORMANCE SERIES SAGITTAL BLADE: Brand: STRYKER PERFORMANCE SERIES

## (undated) DEVICE — INTENT OT USE PROVIDES A STERILE INTERFACE BETWEEN THE OPERATING ROOM SURGICAL LAMPS (NON-STERILE) AND THE SURGEON OR STAFF WORKING IN THE STERILE FIELD.: Brand: ASPEN® ALC PLUS LIGHT HANDLE COVER

## (undated) DEVICE — BANDAGE COMPR M W6INXL10YD WHT BGE VELC E MTRX HK AND LOOP

## (undated) DEVICE — ELECTRODE PT RET AD L9FT HI MOIST COND ADH HYDRGEL CORDED

## (undated) DEVICE — SUTURE MCRYL SZ 3-0 L27IN ABSRB UD L19MM PS-2 3/8 CIR PRIM Y427H

## (undated) DEVICE — CONTAINER,SPECIMEN,4OZ,OR STRL: Brand: MEDLINE

## (undated) DEVICE — INTENDED FOR TISSUE SEPARATION, AND OTHER PROCEDURES THAT REQUIRE A SHARP SURGICAL BLADE TO PUNCTURE OR CUT.: Brand: BARD-PARKER ® CARBON RIB-BACK BLADES

## (undated) DEVICE — DRESSING HYDROFIBER AQUACEL AG ADVANTAGE 3.5X14 IN

## (undated) DEVICE — SUTURE VCRL 1 L27IN ABSRB CT BRAID COAT UD J281H

## (undated) DEVICE — SHOULDER SUSPENSION KIT 6 PER BOX

## (undated) DEVICE — SOLUTION IRRIG 3000ML 0.9% SOD CHL USP UROMATIC PLAS CONT

## (undated) DEVICE — SUTURE PDS II SZ 0 L27IN ABSRB VLT L26MM CT-2 1/2 CIR Z334H

## (undated) DEVICE — 3.5 MM INCISOR STRAIGHT BLADES,                                    POWER/EP-1, MEDIUM GRAY, PACKAGED 6                                    PER BOX, STERILE

## (undated) DEVICE — REM POLYHESIVE ADULT PATIENT RETURN ELECTRODE: Brand: VALLEYLAB

## (undated) DEVICE — SPONGE GZ W4XL4IN COT 12 PLY TYP VII WVN C FLD DSGN STERILE

## (undated) DEVICE — SUTURE PROL SZ 3-0 L18IN NONABSORBABLE BLU L19MM PC-5 3/8 8632G

## (undated) DEVICE — SPONGE GZ W4XL4IN COT RADPQ HIGHLY ABSRB

## (undated) DEVICE — STRIP,CLOSURE,WOUND,MEDI-STRIP,1/2X4: Brand: MEDLINE

## (undated) DEVICE — Z INACTIVE USE 2854097 SPONGE GZ W4XL4IN COT 12 PLY TYP VII WVN C FLD DSGN

## (undated) DEVICE — 4.5 MM HELICUT STRAIGHT BURRS,                                    POWER/EP-1, SLATE, 5000 MAXIMUM RPM,                                    PACKAGED 6 PER BOX, STERILE: Brand: DYONICS HELICUT

## (undated) DEVICE — PADDING CST 6IN STERILE --

## (undated) DEVICE — MARKER,SKIN,WI/RULER AND LABELS: Brand: MEDLINE

## (undated) DEVICE — APPLICATOR MEDICATED 26 CC SOLUTION HI LT ORNG CHLORAPREP